# Patient Record
Sex: MALE | Race: WHITE | NOT HISPANIC OR LATINO | Employment: FULL TIME | URBAN - NONMETROPOLITAN AREA
[De-identification: names, ages, dates, MRNs, and addresses within clinical notes are randomized per-mention and may not be internally consistent; named-entity substitution may affect disease eponyms.]

---

## 2018-01-08 ENCOUNTER — OFFICE VISIT CONVERTED (OUTPATIENT)
Dept: FAMILY MEDICINE CLINIC | Age: 48
End: 2018-01-08
Attending: FAMILY MEDICINE

## 2019-02-23 ENCOUNTER — OFFICE VISIT CONVERTED (OUTPATIENT)
Dept: FAMILY MEDICINE CLINIC | Age: 49
End: 2019-02-23
Attending: NURSE PRACTITIONER

## 2019-03-20 ENCOUNTER — HOSPITAL ENCOUNTER (OUTPATIENT)
Dept: OTHER | Facility: HOSPITAL | Age: 49
Discharge: HOME | End: 2019-03-20
Attending: NURSE PRACTITIONER

## 2019-03-20 ENCOUNTER — OFFICE VISIT CONVERTED (OUTPATIENT)
Dept: FAMILY MEDICINE CLINIC | Age: 49
End: 2019-03-20
Attending: NURSE PRACTITIONER

## 2019-03-20 LAB
ALBUMIN SERPL-MCNC: 4.3 G/DL (ref 3.5–5)
ALBUMIN/GLOB SERPL: 1.7 {RATIO} (ref 1.4–2.6)
ALP SERPL-CCNC: 67 U/L (ref 53–128)
ALT SERPL-CCNC: 24 U/L (ref 10–40)
ANION GAP SERPL CALC-SCNC: 16 MMOL/L (ref 8–19)
AST SERPL-CCNC: 20 U/L (ref 15–50)
BILIRUB SERPL-MCNC: 0.32 MG/DL (ref 0.2–1.3)
BUN SERPL-MCNC: 16 MG/DL (ref 5–25)
BUN/CREAT SERPL: 11 {RATIO} (ref 6–20)
CALCIUM SERPL-MCNC: 9.2 MG/DL (ref 8.7–10.4)
CHLORIDE SERPL-SCNC: 102 MMOL/L (ref 99–111)
CHOLEST SERPL-MCNC: 187 MG/DL (ref 107–200)
CHOLEST/HDLC SERPL: 4.3 {RATIO} (ref 3–6)
CONV CO2: 24 MMOL/L (ref 22–32)
CONV TOTAL PROTEIN: 6.9 G/DL (ref 6.3–8.2)
CREAT UR-MCNC: 1.41 MG/DL (ref 0.7–1.2)
ERYTHROCYTE [DISTWIDTH] IN BLOOD BY AUTOMATED COUNT: 12.8 % (ref 11.5–14.5)
GFR SERPLBLD BASED ON 1.73 SQ M-ARVRAT: 58 ML/MIN/{1.73_M2}
GLOBULIN UR ELPH-MCNC: 2.6 G/DL (ref 2–3.5)
GLUCOSE SERPL-MCNC: 83 MG/DL (ref 70–99)
HBA1C MFR BLD: 14.6 G/DL (ref 14–18)
HCT VFR BLD AUTO: 43.6 % (ref 42–52)
HDLC SERPL-MCNC: 44 MG/DL (ref 40–60)
LDLC SERPL CALC-MCNC: 105 MG/DL (ref 70–100)
MCH RBC QN AUTO: 29 PG (ref 27–31)
MCHC RBC AUTO-ENTMCNC: 33.5 G/DL (ref 33–37)
MCV RBC AUTO: 86.5 FL (ref 80–96)
OSMOLALITY SERPL CALC.SUM OF ELEC: 286 MOSM/KG (ref 273–304)
PLATELET # BLD AUTO: 223 10*3/UL (ref 130–400)
PMV BLD AUTO: 10 FL (ref 7.4–10.4)
POTASSIUM SERPL-SCNC: 4.2 MMOL/L (ref 3.5–5.3)
RBC # BLD AUTO: 5.04 10*6/UL (ref 4.7–6.1)
SODIUM SERPL-SCNC: 138 MMOL/L (ref 135–147)
TRIGL SERPL-MCNC: 191 MG/DL (ref 40–150)
VLDLC SERPL-MCNC: 38 MG/DL (ref 5–37)
WBC # BLD AUTO: 6.51 10*3/UL (ref 4.8–10.8)

## 2019-10-30 ENCOUNTER — OFFICE VISIT CONVERTED (OUTPATIENT)
Dept: FAMILY MEDICINE CLINIC | Age: 49
End: 2019-10-30
Attending: NURSE PRACTITIONER

## 2019-11-06 ENCOUNTER — OFFICE VISIT CONVERTED (OUTPATIENT)
Dept: FAMILY MEDICINE CLINIC | Age: 49
End: 2019-11-06
Attending: NURSE PRACTITIONER

## 2019-11-14 ENCOUNTER — OFFICE VISIT CONVERTED (OUTPATIENT)
Dept: FAMILY MEDICINE CLINIC | Age: 49
End: 2019-11-14
Attending: NURSE PRACTITIONER

## 2019-12-02 ENCOUNTER — HOSPITAL ENCOUNTER (OUTPATIENT)
Dept: SURGERY | Facility: CLINIC | Age: 49
Discharge: HOME | End: 2019-12-02
Attending: UROLOGY

## 2019-12-02 ENCOUNTER — OFFICE VISIT CONVERTED (OUTPATIENT)
Dept: UROLOGY | Facility: CLINIC | Age: 49
End: 2019-12-02
Attending: UROLOGY

## 2019-12-16 LAB
COLOR STONE: NORMAL
COMPN STONE: NORMAL
CONV CA OXALATE DIHYDRATE: 25 %
CONV CA OXALATE MONOHYDRATE: 65 %
CONV CALCIUM PHOSPHATE: 10 %
CONV CALCULI COMMENT: NORMAL
CONV CALCULI DISCLAIMER: NORMAL
CONV CALCULI NOTE: NORMAL
NIDUS STONE QL: NORMAL
SIZE STONE: NORMAL MM
SURFACE CRYSTALS: NORMAL
WT STONE: 3 MG

## 2020-01-10 ENCOUNTER — OFFICE VISIT CONVERTED (OUTPATIENT)
Dept: FAMILY MEDICINE CLINIC | Age: 50
End: 2020-01-10
Attending: FAMILY MEDICINE

## 2020-10-07 ENCOUNTER — OFFICE VISIT (OUTPATIENT)
Dept: ORTHOPEDIC SURGERY | Facility: CLINIC | Age: 50
End: 2020-10-07

## 2020-10-07 ENCOUNTER — HOSPITAL ENCOUNTER (OUTPATIENT)
Dept: MRI IMAGING | Facility: HOSPITAL | Age: 50
Discharge: HOME | End: 2020-10-07
Admitting: NURSE PRACTITIONER

## 2020-10-07 VITALS — HEIGHT: 69 IN | TEMPERATURE: 98 F | WEIGHT: 170 LBS | BODY MASS INDEX: 25.18 KG/M2

## 2020-10-07 DIAGNOSIS — M25.511 RIGHT SHOULDER PAIN, UNSPECIFIED CHRONICITY: Primary | ICD-10-CM

## 2020-10-07 DIAGNOSIS — M75.41 IMPINGEMENT SYNDROME OF RIGHT SHOULDER: ICD-10-CM

## 2020-10-07 DIAGNOSIS — M25.511 RIGHT SHOULDER PAIN, UNSPECIFIED CHRONICITY: ICD-10-CM

## 2020-10-07 PROCEDURE — 73030 X-RAY EXAM OF SHOULDER: CPT | Performed by: NURSE PRACTITIONER

## 2020-10-07 PROCEDURE — 99203 OFFICE O/P NEW LOW 30 MIN: CPT | Performed by: NURSE PRACTITIONER

## 2020-10-07 PROCEDURE — 73221 MRI JOINT UPR EXTREM W/O DYE: CPT

## 2020-10-07 NOTE — PROGRESS NOTES
Patient Name: Maldonado Hammer   YOB: 1970  Referring Primary Care Physician: Ely Garduno, APRN  BMI: Body mass index is 25.1 kg/m².    Chief Complaint:    Chief Complaint   Patient presents with   • Right Shoulder - Pain        HPI: New pt that is her for right shoulder pain - pt intial injury was in August pulling wire at work at his job as a  and felt shoulder pop.  Pt did conservative care and then reinjured 3 weeks ago digging at work out of town in [employer] and felt pain in shoulder and has had pain ever since.  Patient has been holding his arm close to his body having pain at night and conservative treatment that has been attempted including rest ice heat immobilization and NSAIDs have failed.  Patient is here today for evaluation and treatment    Maldonado Hammer is a 50 y.o. male who presents today for evaluation of   Chief Complaint   Patient presents with   • Right Shoulder - Pain   .     This problem is new to this examiner.     Subjective   Medications:   Home Medications:  Current Outpatient Medications on File Prior to Visit   Medication Sig   • Ibuprofen (ADVIL PO) Take  by mouth As Needed.     No current facility-administered medications on file prior to visit.      Current Medications:  Scheduled Meds:  Continuous Infusions:No current facility-administered medications for this visit.     PRN Meds:.    I have reviewed the patient's medical history in detail and updated the computerized patient record.  Review and summarization of old records includes:    Past Medical History:   Diagnosis Date   • Depression with anxiety       History reviewed. No pertinent surgical history.     Social History     Occupational History   • Not on file   Tobacco Use   • Smoking status: Current Every Day Smoker     Packs/day: 1.00     Years: 25.00     Pack years: 25.00   Substance and Sexual Activity   • Alcohol use: Yes     Frequency: 2-4 times a month   • Drug use: Defer   • Sexual  "activity: Defer      Social History     Social History Narrative   • Not on file        Family History   Problem Relation Age of Onset   • Cancer Mother         colon   • Hypertension Mother        ROS: 14 point review of systems was performed and all other systems were reviewed and are negative except for documented findings in HPI and today's encounter.     Allergies: No Known Allergies  Constitutional:  Denies fever, shaking or chills   Eyes:  Denies change in visual acuity   HENT:  Denies nasal congestion or sore throat   Respiratory:  Denies cough or shortness of breath   Cardiovascular:  Denies chest pain or severe LE edema   GI:  Denies abdominal pain, nausea, vomiting, bloody stools or diarrhea   Musculoskeletal:  Numbness, tingling, pain, or loss of motor function only as noted above in history of present illness.  : Denies painful urination or hematuria  Integument:  Denies rash, lesion or ulceration   Neurologic:  Denies headache or focal weakness  Endocrine:  Denies lymphadenopathy  Psych:  Denies confusion or change in mental status   Hem:  Denies active bleeding    OBJECTIVE:  Physical Exam: 50 y.o. male  Wt Readings from Last 3 Encounters:   10/07/20 77.1 kg (170 lb)   10/07/20 77.1 kg (170 lb)     Ht Readings from Last 1 Encounters:   10/07/20 175.3 cm (69\")     Body mass index is 25.1 kg/m².  Vitals:    10/07/20 1027   Temp: 98 °F (36.7 °C)     Vital signs reviewed.     General Appearance:    Alert, cooperative, in no acute distress                  Eyes: conjunctiva clear  ENT: external ears and nose atraumatic  CV: no peripheral edema  Resp: normal respiratory effort  Skin: no rashes or wounds; normal turgor  Psych: mood and affect appropriate  Lymph: no nodes appreciated  Neuro: gross sensation intact  Vascular:  Palpable peripheral pulse in noted extremity  Musculoskeletal Extremities: Skin is warm dry and intact with good pulses mood sensation right shoulder is held close to the body patient " has very limited range of motion he cannot perform full range of motion even with assistance he has reduced mobility pain and weakness to abduction and abduction elbows nontender wrist is nontender empty can is positive    Radiology:   Right shoulder x-rays done for pain no acute fracture no comparison high riding humeral head arthritic changes  Assessment:     ICD-10-CM ICD-9-CM   1. Right shoulder pain, unspecified chronicity  M25.511 719.41   2. Impingement syndrome of right shoulder  M75.41 726.2        Procedures  Placed in a sling will get an MRI as soon as possible to assess for rotator cuff tear high suspicion for pathology`    Plan: Biomechanics of pertinent body area discussed.  Risks, benefits, alternatives, comparisons, and complications of accepted medicines, injections, recommendations, surgical procedures, and therapies explained and education provided in laymen's terms. Natural history and expected course of this patient's diagnosis discussed along with evaluation of therapies. Questions answered. When appropriate I also discussed proper use of cane, walker, trekking poles.   EXERCISES:  Advice on benefits of, and types of regular/moderate exercise including biomechanical forces involved as it pertains to this complaint.  MEDICATIONS:  Prescription, OTC and Monitoring of Medications per orders to address ortho complaints; Evaluation and discussion of safety, precautions, side effects, and warnings given especially of long term NSAID or steroid therapy.    RICE: Rest, ice, compression, and elevation therapy, Cryotherapy/brachy therapy, and or OTC linaments as indicated with instructions.   MRI.      10/7/2020    Much of this encounter note is an electronic transcription/translation of spoken language to printed text. The electronic translation of spoken language may permit erroneous, or at times, nonsensical words or phrases to be inadvertently transcribed; Although I have reviewed the note for such  errors, some may still exist

## 2020-10-08 ENCOUNTER — TELEPHONE (OUTPATIENT)
Dept: ORTHOPEDIC SURGERY | Facility: CLINIC | Age: 50
End: 2020-10-08

## 2020-10-08 DIAGNOSIS — S43.101A SEPARATION OF RIGHT ACROMIOCLAVICULAR JOINT, INITIAL ENCOUNTER: Primary | ICD-10-CM

## 2020-10-08 NOTE — TELEPHONE ENCOUNTER
----- Message from JIHAN Jin sent at 10/8/2020 11:58 AM EDT -----  No rotator cuff tear  AC joint sprain /separation that- needs a cortisone injection and physical therapy. See if can get in with montse willson/terence for an injection in [place] and I can set up Physical therapy in [place]. Can use sling for comfort. cim

## 2020-10-08 NOTE — TELEPHONE ENCOUNTER
Spoke with patient again regarding his appt.  He elected to moved it up and see Dr Zaragoza on 10/12/2020, appt with Jerrod was cancelled.

## 2020-10-12 ENCOUNTER — OFFICE VISIT (OUTPATIENT)
Dept: ORTHOPEDIC SURGERY | Facility: CLINIC | Age: 50
End: 2020-10-12

## 2020-10-12 VITALS — TEMPERATURE: 98.4 F | WEIGHT: 173 LBS | BODY MASS INDEX: 25.62 KG/M2 | HEIGHT: 69 IN

## 2020-10-12 DIAGNOSIS — M19.011 ARTHRITIS OF RIGHT ACROMIOCLAVICULAR JOINT: Primary | ICD-10-CM

## 2020-10-12 PROCEDURE — 99203 OFFICE O/P NEW LOW 30 MIN: CPT | Performed by: ORTHOPAEDIC SURGERY

## 2020-10-12 NOTE — PROGRESS NOTES
General Exam    Patient: Maldonado Hammer    YOB: 1970    Medical Record Number: [id number]    Chief Complaints: Right shoulder pain    History of Present Illness:     50 y.o. male patient who presents for evaluation treatment of right shoulder pain.  Patient states he has had right shoulder pain for the past 4 weeks.  He is right-hand dominant.  States 4 weeks ago he works as an  and he was pulling some cable and heard a pop and then had pain.  He gave a rest for a couple weeks does not seem to improve so he [facility].  He describes the pain is dull and achy but sharp with certain movements.  Overall he has not lost any function but certain extreme motions cause him discomfort.  X-rays and MRI were done since his recent visit.  He does not note any new changes.  Symptoms not radiate down his arm.  Rest and avoiding certain activities make it better certain movements seem to make it worse.  He has taken some Aleve which is given him some relief as well.    Denies any numbness or tingling.  Denies any fevers, cough or shortness of breath.    Allergies: No Known Allergies    Home Medications:      Current Outpatient Medications:   •  Ibuprofen (ADVIL PO), Take  by mouth As Needed., Disp: , Rfl:     Past Medical History:   Diagnosis Date   • Depression with anxiety        History reviewed. No pertinent surgical history.    Social History     Occupational History   • Not on file   Tobacco Use   • Smoking status: Current Every Day Smoker     Packs/day: 1.00     Years: 25.00     Pack years: 25.00   Substance and Sexual Activity   • Alcohol use: Yes     Frequency: 2-4 times a month   • Drug use: Defer   • Sexual activity: Defer      Social History     Social History Narrative   • Not on file       Family History   Problem Relation Age of Onset   • Cancer Mother         colon   • Hypertension Mother        Review of Systems:      Constitutional: Denies fever, shaking or chills  "  Eyes: Denies change in visual acuity   HEENT: Denies nasal congestion or sore throat   Respiratory: Denies cough or shortness of breath   Cardiovascular: Denies chest pain or edema  Endocrine: Denies tremors, palpitations, intolerance of heat or cold, polyuria, polydipsia.  GI: Denies abdominal pain, nausea, vomiting, bloody stools or diarrhea  : Denies frequency, urgency, incontinence, retention, or nocturia.  Musculoskeletal: Denies numbness, tingling or loss of motor function except as above  Integument: Denies rash, lesion or ulceration   Neurologic: Denies headache or focal weakness, deficits  Heme: Denies spontaneous or excessive bleeding, epistaxis, hematuria, melena, fatigue, enlarged or tender lymph nodes.      All other pertinent positives and negatives as noted above in HPI.    Physical Exam: 50 y.o. male    Vitals:    10/12/20 1537   Temp: 98.4 °F (36.9 °C)   TempSrc: Temporal   Weight: 78.5 kg (173 lb)   Height: 175.3 cm (69\")       General:  Patient is awake and alert.  Appears in no acute distress or discomfort.    Psych:  Affect and demeanor are appropriate.    Eyes:  Conjunctiva and sclera appear grossly normal.  Eyes track well and EOM seem to be intact.    Ears:  No gross abnormalities.  Hearing adequate for the exam.    Cardiovascular:  Regular rate and rhythm.    Lungs:  Good chest expansion.  Breathing unlabored.    Lymph:  No palpable masses or adenopathy in the affected extremity    Musculoskeletal/Extremities:      Right upper extremity:    Observation shoulder symmetry is equal bilaterally.  He has some tenderness to deep palpation along the AC joint.  Shoulder range of motion is full however he does have some pain with reaching over to the other shoulder and with  Adduction and internal rotation of the arm.  5 out of 5 to all muscle groups tested.  Sensation intact distally light touch.  Skin is intact.  2+ radial pulses.    Positive crossover test    Positive Neer, negative Weber, " Monica, belly press, external rotation lag.         Radiology:       3 views of the right shoulder were evaluated in the office today.  Taken to evaluate the patient's complaint/s.  Glenohumeral joint space maintained no acute fractures lesions appreciated.  There is some decreased joint space with minimal osteophyte formation in the AC joint suggestive of AC joint arthritis.    MRI of the right shoulder was reviewed rotator cuff is intact there is evidence of some AC joint inflammation with bone edema distal to the clavicle.  No acute fractures or lesions appreciated.     No imaging for comparison.    Assessment/Plan:      Right AC joint arthritis    Conservative measures consist of anti-inflammatories, activity modification, formal physical therapy, and ultrasound-guided steroid injection of the AC joint.  Patient would like to proceed with conservative management he will continue take his anti-inflammatory we ordered him formal physical therapy he work on activity modification at his job for the time being and an order was put in for ultrasound-guided steroid injection.  I discussed that if these conservative measures fail we may need to do have him evaluated potential surgical intervention if so I would like him to be referred to Dr. Frye for this.   He does not improve over the next 8 weeks he may return for further evaluation and treatment.        We will plan for follow up as needed.    All questions were answered.  Patient understands and agrees with the plan.    Evans Zaragoza MD    10/12/2020    CC to Ely Garduno APRN    Procedures

## 2020-10-13 ENCOUNTER — TELEPHONE (OUTPATIENT)
Dept: ORTHOPEDICS | Facility: OTHER | Age: 50
End: 2020-10-13

## 2020-10-13 NOTE — TELEPHONE ENCOUNTER
PATIENT: CHRISTI SINGH  CONTACT: [phone]  PROVIDER: LORI LAFLEUR    PATIENT CONTACTED TO SPEAK TO DR. LAFLEUR AND WANTED TO SPEAK ABOUT  HIS CARLEE. INJECTION. PATIENT HAD AN APPT. WITH  AND DIRECTED PATIENT THAT HE COULD NOT GIVE HIM THE INJECTION WITHOUT HAVING AN ULTRASOUND.     HE WOULD LIKE DR. LORI LAFLEUR TO CONTACT HIM BACK ABOUT THIS.    THANK YOU

## 2020-10-14 ENCOUNTER — TELEPHONE (OUTPATIENT)
Dept: ORTHOPEDIC SURGERY | Facility: CLINIC | Age: 50
End: 2020-10-14

## 2020-10-14 ENCOUNTER — CLINICAL SUPPORT (OUTPATIENT)
Dept: ORTHOPEDIC SURGERY | Facility: CLINIC | Age: 50
End: 2020-10-14

## 2020-10-14 VITALS — WEIGHT: 170 LBS | TEMPERATURE: 97.5 F | BODY MASS INDEX: 25.18 KG/M2 | HEIGHT: 69 IN

## 2020-10-14 DIAGNOSIS — M19.011 ARTHRITIS OF RIGHT ACROMIOCLAVICULAR JOINT: Primary | ICD-10-CM

## 2020-10-14 PROCEDURE — 20610 DRAIN/INJ JOINT/BURSA W/O US: CPT | Performed by: PHYSICIAN ASSISTANT

## 2020-10-14 RX ADMIN — METHYLPREDNISOLONE ACETATE 160 MG: 80 INJECTION, SUSPENSION INTRA-ARTICULAR; INTRALESIONAL; INTRAMUSCULAR; SOFT TISSUE at 14:52

## 2020-10-14 NOTE — ADDENDUM NOTE
Addended by: JONH LEONARDO on: 10/14/2020 11:35 AM     Modules accepted: Level of Service, SmartSet

## 2020-10-14 NOTE — TELEPHONE ENCOUNTER
Called the patient as he is seeking treatment now with Jerrod Chi after seeing Dr Zaragoza.   The patient states he wants to follow up in [place] since he lives there.     We also discussed his work comp claim.  He states he did feel a pop while working.   I explained that has indicated numerous times, on his paperwork and during his examination with the providers.   I indicated that we can't knowingly bill his private insurance.   We will attempt to get his work comp approved and work to get his MRI, previously performed, covered as well.     Patient understands that once he initiates his care in [place], he will follow with them.  He also understands that he may receive a denial from [facility] for his MRI and that we make no guarantee to be able to get this approved after the fact.     richard

## 2020-10-14 NOTE — TELEPHONE ENCOUNTER
Spoke with patient and informed him that Jerrod can work him in this afternoon at 2:30.  Patient voiced understanding and was agreeable with the appt.  I informed him that he should just follow up with her or [facility] for further shoulder problems.

## 2020-10-16 ENCOUNTER — TELEPHONE (OUTPATIENT)
Dept: ORTHOPEDIC SURGERY | Facility: CLINIC | Age: 50
End: 2020-10-16

## 2020-10-18 PROBLEM — M19.011 ARTHRITIS OF RIGHT ACROMIOCLAVICULAR JOINT: Status: ACTIVE | Noted: 2020-10-18

## 2020-10-18 RX ORDER — METHYLPREDNISOLONE ACETATE 80 MG/ML
160 INJECTION, SUSPENSION INTRA-ARTICULAR; INTRALESIONAL; INTRAMUSCULAR; SOFT TISSUE
Status: COMPLETED | OUTPATIENT
Start: 2020-10-14 | End: 2020-10-14

## 2020-10-19 NOTE — PROGRESS NOTES
INJECTION      Patient: Maldonado Hammer    MRN: [id number]    YOB: 1970    Chief Complaint   Patient presents with   • Right Shoulder - Follow-up, Pain   • Injections         History of Present Illness:  Maldonado Hammer is here today for injection therapy. He is receiving first time  RIGHT shoulder injections of steroid. He has been seen in our [place] office by JIHAN Jin and Dr. Evans Zaragoza. He was scheduled with me for an appointment because it is closer to his residence. MRI of the shoulder reveals AC joint arthritis. He reports pain over the AC joint with reaching across his body and with reaching behind his body. Treatment options have been discussed and he understands and consents.       Physical Exam:   50 y.o. male awake, alert, oriented, in no acute distress and well developed, well nourished.  RIGHT shoulder  · Positive for pain and tenderness with palpation over the subcromial bursa.   · Forward flexion is 0- 130 degrees, abduction is 0-130 degrees.  · Neer sign is positive.   · Sulcus sign is negative. Drop arm sign is negative.   · Apprehension sign is negative.   · Axillary nerve function is well preserved. Radial artery pulses are palpable.   · There is no evidence of multidirectional instability.   · The pain level is 6.      Procedures  See separate procedure note  Injection site was identified by physical examination and cleaned with Betadine and alcohol swabs. Prior to needle insertion, ethyl chloride spray was used for surface anesthesia. Sterile technique was used.       ASSESSMENT:  Diagnoses and all orders for this visit:    1. Arthritis of right acromioclavicular joint (Primary)  -     Large Joint Arthrocentesis: R subacromial bursa          PLAN:   • Right shoulder steroid injection was discussed with the patient. Discussed indication, risks, benefits, and alternatives. Verbal consent was given to proceed with the procedure.   • Injection was performed from  anteromedial approach.  Patient tolerated the procedure well and no complications were encountered.  • Discussion of orthopedic goals and activities and patient/guardian expressed understanding.  • Ice, heat, rest, compression and elevation of extremity as beneficial  • nsaids and/or tylenol as beneficial  • Instructed to refrain from heavy activity/rest the extremity for the next 24-48 hours  • Discussion regarding possibility of cortisol flare and what to expect if this occurs  • Watch for signs and symptoms of infection  • Call if adverse effect from injection therapy  • Follow up in 3 months      Jerrod Chi PA-C  Encounter Date: 10/14/2020    Electronically signed by Jerrod Chi PA-C, 10/18/20, 10:51 PM EDT.      EMR Dragon/Transcription disclaimer:  Much of this encounter note is an electronic transcription/translation of spoken language to printed text. The electronic translation of spoken language may permit erroneous, or at times, nonsensical words or phrases to be inadvertently transcribed; Although I have reviewed the note for such errors, some may still exist.

## 2021-01-13 ENCOUNTER — CLINICAL SUPPORT (OUTPATIENT)
Dept: ORTHOPEDIC SURGERY | Facility: CLINIC | Age: 51
End: 2021-01-13

## 2021-01-13 VITALS — BODY MASS INDEX: 25.18 KG/M2 | HEIGHT: 69 IN | TEMPERATURE: 96.9 F | WEIGHT: 170 LBS

## 2021-01-13 DIAGNOSIS — M75.41 IMPINGEMENT SYNDROME OF RIGHT SHOULDER: ICD-10-CM

## 2021-01-13 DIAGNOSIS — M25.511 RIGHT SHOULDER PAIN, UNSPECIFIED CHRONICITY: Primary | ICD-10-CM

## 2021-01-13 PROCEDURE — 20610 DRAIN/INJ JOINT/BURSA W/O US: CPT | Performed by: PHYSICIAN ASSISTANT

## 2021-01-13 PROCEDURE — 99213 OFFICE O/P EST LOW 20 MIN: CPT | Performed by: PHYSICIAN ASSISTANT

## 2021-01-13 RX ORDER — LIDOCAINE HYDROCHLORIDE 10 MG/ML
2 INJECTION, SOLUTION INFILTRATION; PERINEURAL
Status: COMPLETED | OUTPATIENT
Start: 2021-01-13 | End: 2021-01-13

## 2021-01-13 RX ORDER — METHYLPREDNISOLONE ACETATE 80 MG/ML
160 INJECTION, SUSPENSION INTRA-ARTICULAR; INTRALESIONAL; INTRAMUSCULAR; SOFT TISSUE
Status: COMPLETED | OUTPATIENT
Start: 2021-01-13 | End: 2021-01-13

## 2021-01-13 RX ADMIN — METHYLPREDNISOLONE ACETATE 160 MG: 80 INJECTION, SUSPENSION INTRA-ARTICULAR; INTRALESIONAL; INTRAMUSCULAR; SOFT TISSUE at 15:17

## 2021-01-13 RX ADMIN — LIDOCAINE HYDROCHLORIDE 2 ML: 10 INJECTION, SOLUTION INFILTRATION; PERINEURAL at 15:17

## 2021-01-13 NOTE — PROGRESS NOTES
"Chief Complaint  Follow-up and Pain of the Right Shoulder    Subjective    History of Present Illness      Maldonado Hammer is a 50 y.o. male who presents to [facility] for is here today for injection therapy. He receives RIGHT shoulder injections of steroid.  Received one prior steroid injection to the shoulder and states he did experience a cortisol flare after but felt great within a few days.  He states it did not give him complete relief of pain and it did not last a full 3 months.  He reports significant tenderness with light palpation over the right AC joint. He is doing repetitive motion in his job and he has been treated under workman's comp since initial visit.  Treatment options have been discussed and he understands and consents.       Objective   Vital Signs:   Temp 96.9 °F (36.1 °C)   Ht 175.3 cm (69\")   Wt 77.1 kg (170 lb)   BMI 25.10 kg/m²     Physical Exam  50 y.o. male is awake, alert, oriented, in no acute distress and well developed, well nourished.  Ortho Exam   RIGHT shoulder  Positive for pain and tenderness with palpation over the subcromial bursa.   Positive for signs of impingement with internal and external rotation.   Forward flexion is 0- 130 degrees, abduction is 0-130 degrees, external rotation is 0-50 degrees.   Rotator cuff function is fairly well preserved except impingement at 90 degrees.   Weber's sign is positive. Neer sign is positive.   Sulcus sign is negative. Drop arm sign is negative.   Apprehension sign is negative.   Axillary nerve function is well preserved. Radial artery pulses are palpable.   There is no evidence of multidirectional instability.   The pain level is 6.      Result Review :   The following data was reviewed by: Jerrod Chi PA-C on 01/13/2021:  Data reviewed: Radiologic studies from initial visit with Suzan Molina on 10/7/20  I, myself, independently interpreted right shoulder xrays 3 views: " moderate AC joint arthritis    Review of MRI report of the right shoulder, summary of radiologist's impression as follows: mild ac joint arthritis with bone marrow and capsular edema around AC joint which could be related to AC joint sprain or inflammation associated with microtrauma.           Procedures   See separate procedure note  Injection site was identified by physical examination and cleaned with Betadine and alcohol swabs. Prior to needle insertion, ethyl chloride spray was used for surface anesthesia. Sterile technique was used.       Assessment   Assessment and Plan    Problem List Items Addressed This Visit     None      Visit Diagnoses     Right shoulder pain, unspecified chronicity    -  Primary    Relevant Orders    Large Joint Arthrocentesis: R glenohumeral        I spent 20 minutes caring for Maldonado on this date of service. This time includes time spent by me in the following activities:reviewing tests, performing a medically appropriate examination and/or evaluation , referring and communicating with other health care professionals , documenting information in the medical record and independently interpreting results and communicating that information with the patient/family/caregiver.  Follow Up   • Right shoulder steroid injection was discussed with the patient. Discussed indication, risks, benefits, and alternatives. Verbal consent was given to proceed with the procedure.   • Injection was performed from anteromedial approach.  Patient tolerated the procedure well and no complications were encountered.  • Discussion of orthopedic goals and activities and patient/guardian expressed understanding.  • Ice, heat, rest, compression and elevation of extremity as beneficial  • nsaids and/or tylenol as beneficial  • Instructed to refrain from heavy activity/rest the extremity for the next 24-48 hours  • Discussion regarding possibility of cortisol flare and what to expect if this occurs  • Watch for  signs and symptoms of infection  • Call if adverse effect from injection therapy  • Follow up in 3 months  • Patient was given instructions and counseling regarding his condition or for health maintenance advice. Please see specific information pulled into the AVS if appropriate.     Jerrod Chi PA-C   Date of Encounter: 1/13/2021   Electronically signed by Jerrod Chi PA-C, 01/13/21, 3:58 PM EST.     EMR Dragon/Transcription disclaimer:  Much of this encounter note is an electronic transcription/translation of spoken language to printed text. The electronic translation of spoken language may permit erroneous, or at times, nonsensical words or phrases to be inadvertently transcribed; Although I have reviewed the note for such errors, some may still exist.

## 2021-01-13 NOTE — PROGRESS NOTES
Procedure   Large Joint Arthrocentesis: R glenohumeral  Date/Time: 1/13/2021 3:17 PM  Consent given by: patient  Site marked: site marked  Timeout: Immediately prior to procedure a time out was called to verify the correct patient, procedure, equipment, support staff and site/side marked as required   Supporting Documentation  Indications: pain   Procedure Details  Location: shoulder - R glenohumeral  Preparation: Patient was prepped and draped in the usual sterile fashion  Needle size: 25 G  Approach: anteromedial  Medications administered: 2 mL lidocaine 1 %; 160 mg methylPREDNISolone acetate 80 MG/ML  Patient tolerance: patient tolerated the procedure well with no immediate complications      Electronically signed by Jerrod Chi PA-C, 01/13/21, 3:56 PM EST.

## 2021-01-26 ENCOUNTER — TELEPHONE (OUTPATIENT)
Dept: ORTHOPEDIC SURGERY | Facility: CLINIC | Age: 51
End: 2021-01-26

## 2021-01-26 NOTE — TELEPHONE ENCOUNTER
Advised I have talked with [facility] about possible shoulder scope and renea resection if he was interested. He states the last shot I administered lasted a couple of weeks. He reports he has some good days and some bad days. He asks if I can send him a paper with the procedure that would be done in surgery so he can call his insurance company and see what his OOP cost would be.   Is This A New Presentation, Or A Follow-Up?: Skin Lesion What Type Of Note Output Would You Prefer (Optional)?: Standard Output How Severe Is Your Skin Lesion?: moderate Has Your Skin Lesion Been Treated?: not been treated Additional History: Patient states he cut himself there a couple months ago. He believes the lesion might just be a scar but wanted to get it evaluate to be sure,

## 2021-05-15 VITALS — HEIGHT: 69 IN | WEIGHT: 175 LBS | RESPIRATION RATE: 14 BRPM | BODY MASS INDEX: 25.92 KG/M2

## 2021-05-18 NOTE — PROGRESS NOTES
Maldonado Hammer 1970     Office/Outpatient Visit    Visit Date: Wed, Oct 30, 2019 04:22 pm    Provider: Ely Garduno N.P. (Assistant: Renita Johnson MA)    Location: [facility]        Electronically signed by Ely Garduno N.P. on  10/30/2019 05:07:49 PM                             SUBJECTIVE:        CC:     Rudolph is a 49 year old White male.  presents today due to complaints of urinary frequency and dysuria X 2 days         HPI:         Concerning urinary frequency, this was first noted 2 days ago.  Associated symptoms include abdominal pain ( mild LLQ abdominal pain ), urinary frequency and dysuria.  He denies associated back pain, chills, fever,  hematuria or urgency.  Medical history is pertinent for renal stones.  Treatments tried thus far include None.  Denies penile discharge.     ROS:     CONSTITUTIONAL:  Negative for chills and fever.      CARDIOVASCULAR:  Negative for chest pain and palpitations.      RESPIRATORY:  Negative for dyspnea and frequent wheezing.      GASTROINTESTINAL:  Positive for abdominal pain ( LLQ ).   Negative for constipation, diarrhea, nausea or vomiting.      GENITOURINARY:  Positive for dysuria and polyuria.   Negative for hematuria.          [facility]/:     Last Reviewed on 10/30/2019 04:47 PM by Ely Garduno    Past Medical History:             PAST MEDICAL HISTORY         Renal Stones         PREVENTIVE HEALTH MAINTENANCE             COLORECTAL CANCER SCREENING: Up to date (colonoscopy q10y; sigmoidoscopy q5y; Cologuard q3y) was last done while in his 20s; colonoscopy with the following abnormalities noted-- Polyp(s)         Surgical History:         Positive for    Vasectomy: 1997; ;         Family History:     Father: Cause of death was CHF     Mother: Cause of death was colon CA         Social History:     Occupation: ;     Marital Status:      Children: 3 children         Tobacco/Alcohol/Supplements:     Last Reviewed on 10/30/2019 04:25  PM by Renita Johnson    Tobacco: Current Smoker: He currently smokes every day, 1 pack per day.          Alcohol: Frequency:    RARE;     Caffeine:  He admits to consuming caffeine via coffee ( 6 servings per day ) and soda ( 2 servings per day ).          Substance Abuse History:     Last Reviewed on 1/08/2018 02:44 PM by Kaiden Hendricks        Mental Health History:     Last Reviewed on 1/08/2018 02:44 PM by Kaiden Hendricks        Communicable Diseases (eg STDs):     Last Reviewed on 1/08/2018 02:44 PM by Kaiden Hendricks            Current Problems:     Last Reviewed on 1/08/2018 02:44 PM by Kaiden Hendricks    Urinary frequency     History of colonic polyps     Cigarette smoking         Immunizations:     Tetanus immune globulin (TIG), human, IM 1/1/2014     Fluzone Quadrivalent (3+ years) 2/23/2019     Fluzone Quadrivalent (3+ years) 10/30/2019         Allergies:     Last Reviewed on 10/30/2019 04:25 PM by Renita Johnson    Penicillins:        Current Medications:     Last Reviewed on 10/30/2019 04:25 PM by Renita Johnson    None        OBJECTIVE:        Vitals:         Current: 10/30/2019 4:27:13 PM    Ht:  5 ft, 8.5 in;  Wt: 175 lbs;  BMI: 26.2    T: 98.3 F (oral);  BP: 124/76 mm Hg (right arm, sitting);  P: 87 bpm (right arm (BP Cuff), sitting);  sCr: 1.41 mg/dL;  GFR: 60.17        Exams:     PHYSICAL EXAM:     GENERAL: well developed, well nourished;  no apparent distress;     RESPIRATORY: normal respiratory rate and pattern with no distress; normal breath sounds with no rales, rhonchi, wheezes or rubs;     CARDIOVASCULAR: normal rate; rhythm is regular;     GASTROINTESTINAL: nontender; normal bowel sounds; no organomegaly;     MUSCULOSKELETAL: normal gait;     NEUROLOGIC: mental status: alert and oriented x 3; GROSSLY INTACT     PSYCHIATRIC: appropriate affect and demeanor;         Lab/Test Results:             Glucose, Urine:  Neg (10/30/2019),     Bilirubin, urine:  Negative  (10/30/2019),     Ketones, Urine Strip:  Negative (10/30/2019),     Specific Gravity, urine:  1.020 (10/30/2019),     Blood in Urine:  moderate (10/30/2019),     pH, urine:  6.5 (10/30/2019),     Protein Urine QL:  negative (10/30/2019),     Urobilinogen, urine:  0.2 E.U./dL (10/30/2019),     Nitrite, Urine:  Negative (10/30/2019),     Leukoctyes, urine:  Negative (10/30/2019),     Appearance:  Clear (10/30/2019),     collection source:  Clean-catch (10/30/2019),     Color:  Yellow (10/30/2019),     Performed by::  AS (10/30/2019),             Procedures:     Vaccination against other viral diseases, Influenza     1. Influenza, seasonal PF (children 3 years to adult): 0.5 ml unit dose given IM in the left upper arm; administered by AS;  lot number [id number]; expires 06/30/2020 Regarding contraindications to an Influenza vaccine: Denies moderate/severe illness with/without fever; serious reaction to eggs, egg proteins, gentamicin, gelatin, arginine, neomycin or polymixin; serious reaction after recieving previous influenza vaccines; and history of Guillain-Rantoul Syndrome.              ASSESSMENT           578.0   K92.0  Hematemesis              DDx:     V04.81   Z23  Vaccination against other viral diseases, Influenza              DDx:         ORDERS:         Meds Prescribed:       Flomax (Tamsulosin HCl) 0.4mg Capsules Take 1 capsule(s) by mouth daily  #30 (Thirty) capsule(s) Refills: 0         Lab Orders:       [id number]  Urinalysis, automated, without microscopy  (In-House)           Procedures Ordered:       80517  Immunization administration; one vaccine  (In-House)           Other Orders:       94650  Influenza virus vaccine, quadrivalent, split virus, preservative free 3 years of age & older  (In-House)                   PLAN:          Hematemesis With history of renal stone and blood in urine, discussed possibility of another stone. No signs of infection on urine testing. Advised increased water intake. Decrease  caffeine intake. Patient declines imaging such as CT today. Will follow up if symptoms worsen. Will set reminder to repeat UA in 6 weeks to ensure no blood in urine.     MIPS Vaccines Flu and Pneumonia updated in Shot record     FOLLOW-UP: for Annual Checkup:Patient will call to schedule follow-up appointment           Prescriptions:       Flomax (Tamsulosin HCl) 0.4mg Capsules Take 1 capsule(s) by mouth daily  #30 (Thirty) capsule(s) Refills: 0           Orders:       98643  Urinalysis, automated, without microscopy  (In-House)            Vaccination against other viral diseases, Influenza           Orders:       56132  Immunization administration; one vaccine  (In-House)         32201  Influenza virus vaccine, quadrivalent, split virus, preservative free 3 years of age & older  (In-House)               CHARGE CAPTURE           **Please note: ICD descriptions below are intended for billing purposes only and may not represent clinical diagnoses**        Primary Diagnosis:         578.0 Hematemesis            K92.0    Hematemesis              Orders:          46020   Office/outpatient visit; established patient, level 3  (In-House)             09631   Urinalysis, automated, without microscopy  (In-House)           V04.81 Vaccination against other viral diseases, Influenza            Z23    Encounter for immunization              Orders:          99453   Immunization administration; one vaccine  (In-House)             02069   Influenza virus vaccine, quadrivalent, split virus, preservative free 3 years of age & older  (In-House)               ADDENDUMS:      ____________________________________    Addendum: 11/06/2019 01:33 PM - Ely Garduno        Change diagnosis to hematuria.

## 2021-05-18 NOTE — PROGRESS NOTES
Maldonado Hammer  1970     Office/Outpatient Visit    Visit Date: Fri, Howard 10, 2020 10:53 am    Provider: Abelardo Edward MD (Assistant: Nora Michael MA)    Location: [facility]        Electronically signed by Abelardo Edward MD on  01/10/2020 07:01:17 PM                             Subjective:        CC: Rudolph is a 49 year old White male.  He presents with cough, congestion, body aches.          HPI:       BP is 138/90 and HR is 70. Patient states he has body aches, sore throat, sinus pressure with drainage and productive cough with green sputum that started about 3 days ago. He has taken dayquil,a histamine and Bromfed DM at night. No fever no headache just sinus pressure. His wife had similar Sx last week. Yesterday was really bad with watery eyes and sinus pressure. Today very achy. No fever.    ROS:     CONSTITUTIONAL:  Positive for fatigue and fever.      EYES:  Negative for blurred vision.      E/N/T:  Positive for sinus pressure and sore throat.   Negative for diminished hearing or nasal congestion.      CARDIOVASCULAR:  Negative for chest pain and palpitations.      RESPIRATORY:  Positive for recent cough ( with scant amounts of purulent sputum ).   Negative for dyspnea.      GASTROINTESTINAL:  Negative for abdominal pain, constipation, diarrhea, nausea and vomiting.      GENITOURINARY:  Negative for dysuria.      MUSCULOSKELETAL:  Positive for myalgias.   Negative for arthralgias.      INTEGUMENTARY:  Negative for atypical mole(s) and rash.      NEUROLOGICAL:  Negative for paresthesias and weakness.      PSYCHIATRIC:  Negative for anxiety, depression and sleep disturbance.          Past Medical History / Family History / Social History:         Last Reviewed on 1/10/2020 11:33 AM by Abelardo Edward    Past Medical History:             PAST MEDICAL HISTORY         Renal Stones         PREVENTIVE HEALTH MAINTENANCE             COLORECTAL CANCER SCREENING: Up to date (colonoscopy  q10y; sigmoidoscopy q5y; Cologuard q3y) was last done while in his 20s; colonoscopy with the following abnormalities noted-- Polyp(s)         Surgical History:         Positive for    Vasectomy: 1997; ;         Family History:     Father: Cause of death was CHF     Mother: Cause of death was colon CA         Social History:     Occupation: ;     Marital Status:      Children: 3 children         Tobacco/Alcohol/Supplements:     Last Reviewed on 1/10/2020 11:33 AM by Abelardo Edward    Tobacco: Current Smoker: He currently smokes every day, 1 pack per day.          Alcohol: Frequency:    RARE;     Caffeine:  He admits to consuming caffeine via coffee ( 6 servings per day ) and soda ( 2 servings per day ).          Substance Abuse History:     Last Reviewed on 1/10/2020 11:33 AM by Abelardo Edward        Mental Health History:     Last Reviewed on 1/10/2020 11:33 AM by Abelardo Edward        Communicable Diseases (eg STDs):     Last Reviewed on 1/10/2020 11:33 AM by Abelardo Edward        Current Problems:     Last Reviewed on 1/10/2020 11:33 AM by Abelardo Edward    Nicotine dependence, unspecified, uncomplicated    Cigarette smoking    Personal history of colonic polyps    History of colonic polyps    Urinary calculus, unspecified    Ureteral stone    Acute bronchitis    Cough        Immunizations:     Tetanus immune globulin (TIG), human, IM 1/1/2014    Fluzone Quadrivalent (3+ years) 2/23/2019    Fluzone Quadrivalent (3+ years) 10/30/2019        Allergies:     Last Reviewed on 1/10/2020 11:33 AM by Abelardo Edward    Penicillins:          Current Medications:     Last Reviewed on 1/10/2020 11:33 AM by Abelardo Edward    Flomax 0.4mg Capsules [Take 1 capsule(s) by mouth daily]    Bromfed DM 2-30-10 mg/5 mL oral Syrup [take 10 milliliters by oral route every 4 hours as needed for cough]        Objective:        Vitals:         Current: 1/10/2020 10:58:03 AM    Ht:  5 ft, 8.5 in;  Wt: 174.8  lbs;  BMI: 26.2T: 98.5 F (oral);  BP: 138/90 mm Hg (left arm, sitting);  P: 70 bpm (left arm (BP Cuff), sitting);  sCr: 1.41 mg/dL;  GFR: 60.14        Exams:     PHYSICAL EXAM:     GENERAL: Vitals recorded well developed, well nourished;  well groomed;  no apparent distress, appears minimally ill, tired-appearing;     EYES: extraocular movements intact; conjunctiva and cornea are normal; PERRLA;     E/N/T: NOSE: nasal mucosa is boggy;  OROPHARYNX: posterior pharynx shows erythema;     NECK: range of motion is normal; thyroid exam reveals not enlarged;     RESPIRATORY: normal respiratory rate and pattern with no distress; normal breath sounds with no rales, rhonchi, wheezes or rubs;     CARDIOVASCULAR: normal rate; rhythm is regular;  no systolic murmur; no edema;     GASTROINTESTINAL: nontender; normal bowel sounds;     LYMPHATIC: no enlargement of cervical or facial nodes;     MUSCULOSKELETAL: normal gait; normal overall tone     NEUROLOGIC: mental status: alert and oriented x 3; GROSSLY INTACT     PSYCHIATRIC:  appropriate affect and demeanor; normal speech pattern; grossly normal memory;         Lab/Test Results:         Influenza A and B: Negative (01/10/2020),     Performed by:: tls (01/10/2020),             Assessment:         R05   Cough           ORDERS:         Lab Orders:       [id number]  Infectious agent antigen detection by immunoassay; Influenza  (In-House)            11562  Infectious agent antigen detection by immunoassay; Influenza  (In-House)                      Plan:         CoughPresentation c/w viral URI, flu swab is negative. Pt is advised allergy meds should help with Sx but definitive Tx is time and rest. If Sx worsen or do not resolve then Abx can be considered at that time.           Orders:       [id number]  Infectious agent antigen detection by immunoassay; Influenza  (In-House)            68042  Infectious agent antigen detection by immunoassay; Influenza  (In-House)                  Charge  Capture:         Primary Diagnosis:     R05  Cough           Orders:      62841  Office/outpatient visit; established patient, level 3  (In-House)            34496-85  Infectious agent antigen detection by immunoassay; Influenza  (In-House)            63530  Infectious agent antigen detection by immunoassay; Influenza  (In-House)

## 2021-05-18 NOTE — PROGRESS NOTES
Maldonado Hammer 1970     Office/Outpatient Visit    Visit Date: Sat, Feb 23, 2019 09:25 am    Provider: Ely Garduno N.P. (Assistant: Solo Dukes)    Location: [facility]        Electronically signed by Ely Garduno N.P. on  02/23/2019 09:56:50 AM                             SUBJECTIVE:        CC:     Rudolph is a 49 year old White male.  presents today due to sinus pressure, stuffy nose for about a week (would like if a flu shot today if he can get it)         HPI:         Patient to be evaluated for upper respiratory illness.  These have been present for the past one week.  The symptoms include dry cough, headache, sinus pain/pressure and runny nose.  He denies ear complaints or fever.  He has already tried to relieve the symptoms with Dayquil, Mucinex.      ROS:     CONSTITUTIONAL:  Negative for chills and fever.      EYES:  Negative for blurred vision and eye drainage.      E/N/T:  Positive for sinus pressure.   Negative for ear pain or sore throat.      CARDIOVASCULAR:  Negative for chest pain and palpitations.      RESPIRATORY:  Positive for recent cough ( typically dry ).   Negative for dyspnea or frequent wheezing.      GASTROINTESTINAL:  Negative for abdominal pain and vomiting.          PMH/FMH/SH:     Last Reviewed on 1/08/2018 02:44 PM by Kaiden Hendricks    Past Medical History:         Hospitalizations: AN INFECTION         Surgical History:         Positive for    Vasectomy;         Family History:         Positive for Hypertension.      Positive for Colon Cancer.      Positive for Asthma.          Social History:     Occupation: ELECTRICAL WORK;     Marital Status:          Tobacco/Alcohol/Supplements:     Last Reviewed on 1/08/2018 02:44 PM by Kaiden Hendricks    Tobacco: Current Smoker: He currently smokes every day, 1 pack per day.          Alcohol: Frequency:    RARE;     Caffeine:  He admits to consuming caffeine via coffee ( 4 servings per day ) and soda  ( 2 servings per day ).          Substance Abuse History:     Last Reviewed on 1/08/2018 02:44 PM by Kaiden Hendricks        Mental Health History:     Last Reviewed on 1/08/2018 02:44 PM by Kaiden Hendricks        Communicable Diseases (eg STDs):     Last Reviewed on 1/08/2018 02:44 PM by Kaiden Hendricks            Current Problems:     Last Reviewed on 1/08/2018 02:44 PM by Kaiden Hendricks    Cigarette smoking         Immunizations:     None        Allergies:     Last Reviewed on 1/08/2018 02:44 PM by Kaiden Hendricks    Penicillins:        Current Medications:     Last Reviewed on 1/08/2018 02:44 PM by Kaiden Hendricks    None        OBJECTIVE:        Vitals:         Current: 2/23/2019 9:28:36 AM    Ht:  5 ft, 8.5 in;  Wt: 172.4 lbs;  BMI: 25.8    T: 98.8 F (oral);  BP: 113/69 mm Hg (left arm, sitting);  P: 78 bpm (left arm (BP Cuff), sitting);  sCr: 1.13 mg/dL;  GFR: 74.60        Exams:     PHYSICAL EXAM:     GENERAL: well developed, well nourished;  no apparent distress;     EYES: PERRL, EOMI     E/N/T: EARS: external auditory canal normal;  both TMs are dull;  NOSE: turbinates are mildly swollen bilaterally;  bilateral maxillary and bilateral frontal sinus tenderness present; OROPHARYNX: oral mucosa is normal; posterior pharynx shows no exudate and post nasal drip;     NECK: range of motion is normal; trachea is midline;     RESPIRATORY: normal respiratory rate and pattern with no distress; normal breath sounds with no rales, rhonchi, wheezes or rubs;     CARDIOVASCULAR: normal rate; rhythm is regular;     MUSCULOSKELETAL: normal gait;     NEUROLOGIC: mental status: alert and oriented x 3; GROSSLY INTACT     PSYCHIATRIC: appropriate affect and demeanor;         Procedures:     Influenza vaccination     1. Influenza, seasonal (children 3 years to adult): 0.5 ml unit dose given IM in the left upper arm; administered by and;  lot number [id number]; expires 6/30/19 Regarding  contraindications to an Influenza vaccine:        No contraindications were noted.              ASSESSMENT           465.9   J06.9  Acute upper respiratory infection              DDx:     V04.81   Z23  Influenza vaccination              DDx:     305.1   F17.200  Cigarette smoking              DDx:         ORDERS:         Meds Prescribed:       Pseudoephedrine HCl 30mg Tablet 1 tablet every 6 hrs prn.  #30 (Thirty) tablet(s) Refills: 0       Fluticasone Propionate 50mcg/1actuation Nasal Spray 1 spray each nostil daily  #16 (Sixteen) gm Refills: 0       Zithromax (Azithromycin) 250mg Tablet 2 tablets on day 1, then 1 tablet on days 2-5.  #6 (Six) tablet(s) Refills: 0         Procedures Ordered:       14035  Immunization administration; one vaccine  (In-House)           Other Orders:       [id number]  Influenza virus vaccine, quadrivalent, split virus, preservative free 3 years of age & older  (In-House)         4004F  Pt scrnd tobacco use rcvd tobacco cessation talk  (In-House)                   PLAN:          Acute upper respiratory infection         RECOMMENDATIONS given include: Push Fluids, Rest, Follow up if no improvement or worsening symptoms like high fevers, vomiting, weakness, or increasing shortness of air.    .      FOLLOW-UP: Schedule follow-up appointments on a p.r.n. basis. for Annual Checkup           Prescriptions:       Pseudoephedrine HCl 30mg Tablet 1 tablet every 6 hrs prn.  #30 (Thirty) tablet(s) Refills: 0       Fluticasone Propionate 50mcg/1actuation Nasal Spray 1 spray each nostil daily  #16 (Sixteen) gm Refills: 0       Zithromax (Azithromycin) 250mg Tablet 2 tablets on day 1, then 1 tablet on days 2-5.  #6 (Six) tablet(s) Refills: 0          Influenza vaccination         IMMUNIZATIONS given today: Influenza Quadrivalent psv free shot 3 & up.            Orders:       51861  Influenza virus vaccine, quadrivalent, split virus, preservative free 3 years of age & older  (In-House)         43947   Immunization administration; one vaccine  (In-House)            Cigarette smoking         RECOMMENDATIONS given include: Counseled on smoking cessation and advised of the benefits to patient's health if he were to stop smoking. Counseling for less than 3 minutes.            Orders:       4004F  Pt scrnd tobacco use rcvd tobacco cessation talk  (In-House)               Patient Recommendations:        For  Acute upper respiratory infection:     Schedule follow-up appointments as needed.          For  Cigarette smoking:         Stop smoking.              CHARGE CAPTURE           **Please note: ICD descriptions below are intended for billing purposes only and may not represent clinical diagnoses**        Primary Diagnosis:         465.9 Acute upper respiratory infection            J06.9    Acute upper respiratory infection, unspecified              Orders:          21676   Office/outpatient visit; established patient, level 3  (In-House)           V04.81 Influenza vaccination            Z23    Encounter for immunization              Orders:          21221   Influenza virus vaccine, quadrivalent, split virus, preservative free 3 years of age & older  (In-House)             74341   Immunization administration; one vaccine  (In-House)           305.1 Cigarette smoking            F17.200    Nicotine dependence, unspecified, uncomplicated              Orders:          4004F   Pt scrnd tobacco use rcvd tobacco cessation talk  (In-House)

## 2021-05-18 NOTE — PROGRESS NOTES
Maldonado Hammer  1970     Office/Outpatient Visit    Visit Date: Thu, Nov 14, 2019 03:52 pm    Provider: Ely Garduno N.P. (Assistant: Irene Church MA)    Location: [facility]        Electronically signed by Ely Garduno N.P. on  11/18/2019 02:29:33 PM                             Subjective:        CC: Rudolph is a 49 year old White male.  headache, congestion, cough PT STATES THE ONLY THING HES TAKING IS FLOMAX         HPI:       (Mild)     Upper respiratory illness noted.  These have been present for the past 3 days.  The symptoms include chest congestion, productive cough, headache and sinus pain/pressure.  He denies fever or wheezing.  He reports recent exposure to illness from wife with bronchitis.  He has already tried to relieve the symptoms with Dayquil.      ROS:     CONSTITUTIONAL:  Negative for chills and fever.      EYES:  Negative for blurred vision and eye drainage.      E/N/T:  Positive for sinus pressure.   Negative for ear pain.      CARDIOVASCULAR:  Negative for chest pain and palpitations.      RESPIRATORY:  Positive for recent cough.   Negative for dyspnea or frequent wheezing.          Past Medical History / Family History / Social History:         Last Reviewed on 10/30/2019 04:47 PM by Ely Garduno    Past Medical History:             PAST MEDICAL HISTORY         Renal Stones         PREVENTIVE HEALTH MAINTENANCE             COLORECTAL CANCER SCREENING: Up to date (colonoscopy q10y; sigmoidoscopy q5y; Cologuard q3y) was last done while in his 20s; colonoscopy with the following abnormalities noted-- Polyp(s)         Surgical History:         Positive for    Vasectomy: 1997; ;         Family History:     Father: Cause of death was CHF     Mother: Cause of death was colon CA         Social History:     Occupation: ;     Marital Status:      Children: 3 children         Tobacco/Alcohol/Supplements:     Last Reviewed on 11/06/2019 01:04 PM by Giovanna Box  N    Tobacco: Current Smoker: He currently smokes every day, 1 pack per day.          Alcohol: Frequency:    RARE;     Caffeine:  He admits to consuming caffeine via coffee ( 6 servings per day ) and soda ( 2 servings per day ).          Substance Abuse History:     Last Reviewed on 1/08/2018 02:44 PM by Kaiden Hendricks        Mental Health History:     Last Reviewed on 1/08/2018 02:44 PM by Kaiden Hendricks        Communicable Diseases (eg STDs):     Last Reviewed on 1/08/2018 02:44 PM by Kaiden Hendricks        Current Problems:     Last Reviewed on 1/08/2018 02:44 PM by Kaiden Hendricks    Nicotine dependence, unspecified, uncomplicated    Cigarette smoking    History of colonic polyps    Personal history of colonic polyps    Urinary calculus, unspecified    Ureteral stone        Immunizations:     Tetanus immune globulin (TIG), human, IM 1/1/2014    Fluzone Quadrivalent (3+ years) 2/23/2019    Fluzone Quadrivalent (3+ years) 10/30/2019        Allergies:     Last Reviewed on 11/06/2019 01:04 PM by Giovanna Box    Penicillins:          Current Medications:     Last Reviewed on 11/06/2019 01:04 PM by Giovanna Box    Flomax 0.4mg Capsules [Take 1 capsule(s) by mouth daily]    Hydrocodone/Acetaminophen 5mg/325mg Tablet [1 or 2 tabs po q 6 hours prn]    Zofran ODT 4mg Tablets, Orally Disintegrating [1 tab every 8 hours PRN for nausea/vomiting]        Objective:        Vitals:         Current: 11/14/2019 4:00:19 PM    Ht:  5 ft, 8.5 in;  Wt: 168.8 lbs;  BMI: 25.3T: 98.6 F (oral);  BP: 110/69 mm Hg (left arm, sitting);  P: 87 bpm (left arm (BP Cuff), sitting);  sCr: 1.41 mg/dL;  GFR: 59.25O2 Sat: 97 % (room air)        Exams:     PHYSICAL EXAM:     GENERAL: well developed, well nourished;  no apparent distress;     EYES: PERRL, EOMI     E/N/T: EARS: external auditory canal normal;  both TMs are dull;  NOSE: normal turbinates; bilateral maxillary sinus tenderness present; OROPHARYNX: oral  mucosa is normal; posterior pharynx shows no exudate and post nasal drip;     NECK: range of motion is normal; trachea is midline;     RESPIRATORY: normal appearance and symmetric expansion of chest wall; normal respiratory rate and pattern with no distress; normal breath sounds with no rales, rhonchi, wheezes or rubs;     CARDIOVASCULAR: normal rate; rhythm is regular;     NEUROLOGIC: mental status: alert and oriented x 3; GROSSLY INTACT     PSYCHIATRIC: appropriate affect and demeanor;         Assessment:         466.0   Acute bronchitis   (Mild)         ORDERS:         Meds Prescribed:       [New Rx] predniSONE 10 mg oral tablet [take 1 tablet (10 mg) by oral route 3 times per day], #15 (fifteen) tablets, Refills: 0 (zero)       [New Rx] Bromfed DM 2-30-10 mg/5 mL oral Syrup [take 10 milliliters by oral route every 4 hours as needed for cough], #400 (four hundred) milliliters, Refills: 0 (zero)                 Plan:         Acute bronchitisWill call next week if no improvement and will send in antibiotic prescription.        RECOMMENDATIONS given include: Push Fluids, Rest, Follow up if no improvement or worsening symptoms like high fevers, vomiting, weakness, or increasing shortness of air.    .  MIPS Vaccines Flu and Pneumonia updated in Shot record     FOLLOW-UP: Chronic visit follow up           Prescriptions:       [New Rx] predniSONE 10 mg oral tablet [take 1 tablet (10 mg) by oral route 3 times per day], #15 (fifteen) tablets, Refills: 0 (zero)       [New Rx] Bromfed DM 2-30-10 mg/5 mL oral Syrup [take 10 milliliters by oral route every 4 hours as needed for cough], #400 (four hundred) milliliters, Refills: 0 (zero)             Charge Capture:         Primary Diagnosis:     466.0  Acute bronchitis           Orders:      69874  Office/outpatient visit; established patient, level 3  (In-House)

## 2021-05-18 NOTE — PROGRESS NOTES
Harman Maldonado ALASDilan 1970     Office/Outpatient Visit    Visit Date: Wed, Mar 20, 2019 03:58 pm    Provider: Ely Garduno N.P. (Assistant: Giovanna Box RN)    Location: [facility]        Electronically signed by Ely Garduno N.P. on  03/20/2019 04:36:55 PM                             SUBJECTIVE:        CC:     Rudolph is a 49 year old White male.  Preventative Exam         HPI:         Health checkup noted.  He performs testicular self-exams occasionally.  Depression screen is performed and is negative.  He is current with his influenza and Td(5 years ago) immunization.      Smoking status: Rudolph currently smokes cigarettes.          PHQ-9 Depression Screening: Completed form scanned and in chart; Total Score 1 Alcohol Consumption Screening: Completed form scanned and in chart; Total Score 3     ROS:     CONSTITUTIONAL:  Negative for chills and fever.      EYES:  Negative for blurred vision and eye drainage.      E/N/T:  Negative for ear pain and sore throat.      CARDIOVASCULAR:  Negative for chest pain and palpitations.      RESPIRATORY:  Negative for dyspnea and frequent wheezing.      GASTROINTESTINAL:  Negative for abdominal pain and vomiting.      GENITOURINARY:  Negative for dysuria and polyuria.      MUSCULOSKELETAL:  Negative for joint stiffness and myalgias.      INTEGUMENTARY:  Negative for rash.      NEUROLOGICAL:  Positive for numbness and tingling in bilateral hands at nights. Improved by wearing brace..   Negative for dizziness or headaches.      HEMATOLOGIC/LYMPHATIC:  Negative for easy bruising and lymphadenopathy.      ENDOCRINE:  Negative for polydipsia and polyphagia.      PSYCHIATRIC:  Negative for depression and suicidal thoughts.          PMH/FMH/SH:     Last Reviewed on 3/20/2019 04:10 PM by Ely Garduno    Past Medical History:             PAST MEDICAL HISTORY         Renal Stones         PREVENTIVE HEALTH MAINTENANCE             COLORECTAL CANCER SCREENING: Up to date  (colonoscopy q10y; sigmoidoscopy q5y; Cologuard q3y) was last done while in his 20s; colonoscopy with the following abnormalities noted-- Polyp(s)         Surgical History:         Positive for    Vasectomy: 1997; ;         Family History:     Father: Cause of death was CHF     Mother: Cause of death was colon CA         Social History:     Occupation: ;     Marital Status:      Children: 3 children         Tobacco/Alcohol/Supplements:     Last Reviewed on 3/20/2019 04:02 PM by Giovanna Box    Tobacco: Current Smoker: He currently smokes every day, 1 pack per day.          Alcohol: Frequency:    RARE;     Caffeine:  He admits to consuming caffeine via coffee ( 6 servings per day ) and soda ( 2 servings per day ).          Substance Abuse History:     Last Reviewed on 1/08/2018 02:44 PM by Kaiden Hendricks        Mental Health History:     Last Reviewed on 1/08/2018 02:44 PM by Kaiden Hendricks        Communicable Diseases (eg STDs):     Last Reviewed on 1/08/2018 02:44 PM by Kaiden Hendricks            Current Problems:     Last Reviewed on 1/08/2018 02:44 PM by Kaiden Hendricks    History of colonic polyps     Cigarette smoking     Carpal tunnel syndrome     Screening for depression     Acute upper respiratory infection         Immunizations:     Tetanus immune globulin (TIG), human, IM 1/1/2014     Fluzone Quadrivalent (3+ years) 2/23/2019         Allergies:     Last Reviewed on 3/20/2019 04:02 PM by Giovanna Box    Penicillins:        Current Medications:     Last Reviewed on 3/20/2019 04:02 PM by Giovanna Box    None        OBJECTIVE:        Vitals:         Current: 3/20/2019 4:04:52 PM    Ht:  5 ft, 8.5 in;  Wt: 172.8 lbs;  BMI: 25.9    T: 98.3 F (oral);  BP: 112/74 mm Hg (right arm, sitting);  P: 78 bpm (right arm (BP Cuff), sitting);  sCr: 1.13 mg/dL;  GFR: 74.68        Exams:     PHYSICAL EXAM:     GENERAL: well developed, well nourished;  no apparent  distress;     EYES: PERRL, EOMI     E/N/T: EARS: external auditory canal normal;  bilateral TMs are normal;  NOSE: normal turbinates; no sinus tenderness; OROPHARYNX: oral mucosa is normal; posterior pharynx shows no exudate;     NECK: range of motion is normal; trachea is midline;     RESPIRATORY: normal respiratory rate and pattern with no distress; normal breath sounds with no rales, rhonchi, wheezes or rubs;     CARDIOVASCULAR: normal rate; rhythm is regular;     GASTROINTESTINAL: nontender; normal bowel sounds; no organomegaly;     LYMPHATIC: no enlargement of cervical or facial nodes;     SKIN:  no significant rashes or lesions; no suspicious moles;     MUSCULOSKELETAL: normal gait; normal range of motion of all major muscle groups; no limb or joint pain with range of motion;     NEUROLOGIC: mental status: alert and oriented x 3; GROSSLY INTACT     PSYCHIATRIC: appropriate affect and demeanor;         ASSESSMENT           V70.0   Z00.00  Health checkup              DDx:     V79.0   Z13.89  Screening for depression              DDx:     V12.72   Z86.010  History of colonic polyps              DDx:     305.1   F17.200  Cigarette smoking              DDx:     354.0   G56.03  Carpal tunnel syndrome              DDx:         ORDERS:         Lab Orders:       [id number]  [address] CBC w/o diff  (Send-Out)         56583  [facility] Comp. Metabolic Panel  (Send-Out)         97466  [facility] Lipid Panel  (Send-Out)           Procedures Ordered:       REFER  Referral to Specialist or Other Facility  (Send-Out)           Other Orders:       4004F  Pt scrnd tobacco use rcvd tobacco cessation talk  (In-House)           Depression screen negative  (In-House)           Negative EtOH screen  (In-House)           Calculated BMI above the upper parameter and a follow-up plan was documented in the medical record  (In-House)                   PLAN:          Health checkup     LABORATORY:  Labs ordered to be performed  today include CBC W/O DIFF, Comprehensive metabolic panel, and lipid panel.      FOLLOW-UP: Schedule follow-up appointments on a p.r.n. basis. for Annual Checkup     COUNSELING was provided today regarding the following topics: healthy eating habits, regular exercise, testicular self-exam, and ADVISED TO SEE AN EYE DOCTOR AND A DENTIST REGULARLY.            Orders:       [id number]  CB2 - [facility] CBC w/o diff  (Send-Out)         82709  COMP - [facility] Comp. Metabolic Panel  (Send-Out)         26588  LPDP - [facility] Lipid Panel  (Send-Out)             Patient Education Handouts:       Physical Exam 40-49 year, Male           Screening for depression     MIPS PHQ-9 Depression Screening Completed form scanned and in chart; Total Score 1 Negative alcohol screen     BMI Elevated - Follow-Up Plan: Insignificant elevation           Orders:         Depression screen negative  (In-House)           Negative EtOH screen  (In-House)           Calculated BMI above the upper parameter and a follow-up plan was documented in the medical record  (In-House)            History of colonic polyps With family history of colon CA in mother and history of colon polyps in his 20s, recommend consideration for repeat colonoscopy. Will refer to gen surg.         REFERRALS:  Referral initiated to a general surgeon ( Dr. Ross Wood ).            Orders:       REFER  Referral to Specialist or Other Facility  (Send-Out)            Cigarette smoking         RECOMMENDATIONS given include: Counseled on smoking cessation and advised of the benefits to patient's health if he were to stop smoking. Counseling for less than 3 minutes.            Orders:       4004F  Pt scrnd tobacco use rcvd tobacco cessation talk  (In-House)            Carpal tunnel syndrome         RECOMMENDATIONS given include: Braces at night. Frequent stretches. and Follow up for worsening symptoms..              Patient Recommendations:        For  Health checkup:     Limit dietary  intake of fat (especially saturated fat) and cholesterol.  Eat a variety of foods, including plenty of fruits, vegetables, and grain containg fiber, limit fat intake to 30% of total calories. Balance caloric intake with energy expended. Maintaining regular physical activity is advised to help prevent heart disease, hypertension, diabetes, and obesity.    Testicular cancer is the #1 cancer in men ages 15-35.  You should regularly examine your testicles for knots, lumps, or tenderness. Testicular pain, even if not associated with any masses, needs to be evaluated by your doctor!  Schedule follow-up appointments as needed.          For  Cigarette smoking:         Stop smoking.              CHARGE CAPTURE           **Please note: ICD descriptions below are intended for billing purposes only and may not represent clinical diagnoses**        Primary Diagnosis:         V70.0 Health checkup            Z00.00    Encounter for general adult medical examination without abnormal findings              Orders:          74756   Preventive medicine, established patient, age 40-64 years  (In-House)           V79.0 Screening for depression            Z13.89    Encounter for screening for other disorder              Orders:             Depression screen negative  (In-House)                Negative EtOH screen  (In-House)                Calculated BMI above the upper parameter and a follow-up plan was documented in the medical record  (In-House)           V12.72 History of colonic polyps            Z86.010    Personal history of colonic polyps    305.1 Cigarette smoking            F17.200    Nicotine dependence, unspecified, uncomplicated              Orders:          4004F   Pt scrnd tobacco use rcvd tobacco cessation talk  (In-House)           354.0 Carpal tunnel syndrome            G56.03    Carpal tunnel syndrome, bilateral upper limbs

## 2021-05-18 NOTE — PROGRESS NOTES
Maldonado HammerDilan 1970     Office/Outpatient Visit    Visit Date: Mon, Jan 8, 2018 02:31 pm    Provider: Kaiden Hendricks MD (Assistant: Alma Rosa Johnson LPN)    Location: [facility]        Electronically signed by Kaiden Hendricks MD on  01/08/2018 07:45:04 PM                             SUBJECTIVE:        CC: cough         HPI:     Rudolph is in today for evaluation of cough.  He started feeling kind of bad on Friday, but his symptoms progressed over the weekend.  He says that last night was the worst so far.  He has had some subjective fever and chills at home.  He feels achy and really does not feel good.  His wife actually drove him here today because he is weak.  He is not throwing up.  He has not wanted to eat today.  He did take some Aleve today.     ROS:         CARDIOVASCULAR:  Negative for chest pain and palpitations.      GASTROINTESTINAL:  Negative for abdominal pain, nausea and vomiting.      INTEGUMENTARY:  Negative for atypical mole(s) and rash.          PM/[facility]/:     Last Reviewed on 1/08/2018 02:44 PM by Kaiden Hendricks    Past Medical History:         Hospitalizations: AN INFECTION         Surgical History:         Positive for    Vasectomy;         Family History:         Positive for Hypertension.      Positive for Colon Cancer.      Positive for Asthma.          Social History:     Occupation: ELECTRICAL WORK;     Marital Status:          Tobacco/Alcohol/Supplements:     Last Reviewed on 1/08/2018 02:44 PM by Kaiden Hendricks    Tobacco: Current Smoker: He currently smokes every day, 1 pack per day.          Alcohol: Frequency:    RARE;     Caffeine:  He admits to consuming caffeine via coffee ( 4 servings per day ) and soda ( 2 servings per day ).          Substance Abuse History:     Last Reviewed on 1/08/2018 02:44 PM by Kaiden Hendricks        Mental Health History:     Last Reviewed on 1/08/2018 02:44 PM by Kaiden Hendricks        Communicable  Diseases (eg STDs):     Last Reviewed on 1/08/2018 02:44 PM by Kaiden Hendricks            Current Problems:     Last Reviewed on 1/08/2018 02:44 PM by Kaiden Hendricks    Cigarette smoking         Immunizations:     None        Allergies:     Last Reviewed on 1/08/2018 02:44 PM by Kaiden Hendricks    Penicillins:        Current Medications:     Last Reviewed on 1/08/2018 02:44 PM by Kaiden Hendricks      No Known Medications.         OBJECTIVE:        Vitals:         Current: 1/8/2018 2:33:42 PM    Ht:  5 ft, 8.5 in;  Wt: 168.8 lbs;  BMI: 25.3    T: 98.1 F (oral);  BP: 122/77 mm Hg (left arm, sitting);  P: 69 bpm (left arm (BP Cuff), sitting);  sCr: 1.13 mg/dL;  GFR: 75.50        Exams:     PHYSICAL EXAM:     GENERAL: Vitals recorded well developed, well nourished;     EYES: extraocular movements intact; conjunctiva and cornea are normal; PERRL;     E/N/T: EARS:  normal external auditory canals and tympanic membranes;  grossly normal hearing; OROPHARYNX:  normal mucosa, dentition, gingiva, and posterior pharynx;     NECK: range of motion is normal; thyroid is non-palpable;     RESPIRATORY: normal respiratory rate and pattern with no distress; decreased breath sounds;     CARDIOVASCULAR: normal rate; rhythm is regular;  no systolic murmur;     GASTROINTESTINAL: nontender; normal bowel sounds; no masses;     SKIN:  no significant rashes or lesions; no suspicious moles;         Lab/Test Results:             Influenza A:  Positive (01/08/2018),     Influenza B:  Negative (01/08/2018),     Performed by::  jigna (01/08/2018),             ASSESSMENT           487.1   J10.1  Influenza, with other respiratory manifestation              DDx:         ORDERS:         Meds Prescribed:       Tamiflu (Oseltamivir) 75mg Capsules Take 1 capsule(s) by mouth bid for 5 days  #10 (Ten) capsule(s) Refills: 0       Bromfed-DM (Brompheniramine/Dextromethorphan/Pseudoephedrine) Syrup 1  to 2  tsp po every 4-6 hrs prn  cough/congestion.  #4 (Four) oz Refills: 1         Lab Orders:       [id number]  Infectious agent antigen detection by immunoassay; Influenza  (In-House)         84621-62  Infectious agent antigen detection by immunoassay; Influenza  (In-House)           Other Orders:       4004F  Pt scrnd tobacco use rcvd tobacco cessation talk  (In-House)                   PLAN:          Influenza, with other respiratory manifestation     LABORATORY:  Labs ordered to be performed today include Flu A&B Flu A Flu B.      RECOMMENDATIONS given include: Today, we will cover Rudolph as noted below.  I would expect him to have gradual improvement from these symptoms.  No other changes are made.  He will miss work tomorrow and return tentatively on Wednesday..  MIPS Smoking cessation encouraged. Counseling for less than 3 minutes.            Prescriptions:       Tamiflu (Oseltamivir) 75mg Capsules Take 1 capsule(s) by mouth bid for 5 days  #10 (Ten) capsule(s) Refills: 0       Bromfed-DM (Brompheniramine/Dextromethorphan/Pseudoephedrine) Syrup 1  to 2  tsp po every 4-6 hrs prn cough/congestion.  #4 (Four) oz Refills: 1           Orders:       59496  Infectious agent antigen detection by immunoassay; Influenza  (In-House)         13801-47  Infectious agent antigen detection by immunoassay; Influenza  (In-House)         4004F  Pt scrnd tobacco use vd tobacco cessation talk  (In-House)             Patient Education Handouts:       Influenza              CHARGE CAPTURE           **Please note: ICD descriptions below are intended for billing purposes only and may not represent clinical diagnoses**        Primary Diagnosis:         487.1 Influenza, with other respiratory manifestation            J10.1    Influenza due to other identified influenza virus with other respiratory manifestations              Orders:          74205   Office/outpatient visit; established patient, level 3  (In-House)             61905   Infectious agent antigen detection  by immunoassay; Influenza  (In-House)             43140 -59  Infectious agent antigen detection by immunoassay; Influenza  (In-House)             4004F   Pt scrnd tobacco use rcvd tobacco cessation talk  (In-House)

## 2021-05-19 NOTE — PROGRESS NOTES
Maldonado HammerDilan 1970     Office/Outpatient Visit    Visit Date: Wed, Nov 6, 2019 01:02 pm    Provider: lEy Garduno N.P. (Assistant: Giovanna Box RN)    Location: [facility]        Electronically signed by Ely Garduno N.P. on  11/06/2019 01:39:35 PM                             SUBJECTIVE:        CC:     Rudolph is a 49 year old White male.  He is here today following a transition of care from the emergency department ( [facility] 11/5/19  for passing out at work ).          HPI: Rudolph presents for follow up after ER visit yesterday. He was seen last month for hematuria without signs of infection on urinalysis. He notes that he started to have left flank pain yesterday that radiated to LLQ of abdomen. He was taking Flomax and Hydrocodone that was prescribed to him from 12/2018 for renal stone. He then passed out at work and was taken to ER. Was found to have 2 mm left ureteral stone. Was seen at [facility] in [facility]. Was advised plenty of fluids and using urine strainer. They thought stone would pass on its own. Has never been seen by urology.             ROS:     CONSTITUTIONAL:  Negative for chills and fever.      CARDIOVASCULAR:  Negative for chest pain and palpitations.      RESPIRATORY:  Negative for dyspnea and frequent wheezing.      GASTROINTESTINAL:  Positive for abdominal pain ( LLQ ), nausea and left flank pain.      GENITOURINARY:  Positive for dysuria and hematuria.          PMH/FMH/SH:     Last Reviewed on 10/30/2019 04:47 PM by Ely Garduno    Past Medical History:             PAST MEDICAL HISTORY         Renal Stones         PREVENTIVE HEALTH MAINTENANCE             COLORECTAL CANCER SCREENING: Up to date (colonoscopy q10y; sigmoidoscopy q5y; Cologuard q3y) was last done while in his 20s; colonoscopy with the following abnormalities noted-- Polyp(s)         Surgical History:         Positive for    Vasectomy: 1997; ;          Family History:     Father: Cause of death was CHF     Mother: Cause of death was colon CA         Social History:     Occupation: ;     Marital Status:      Children: 3 children         Tobacco/Alcohol/Supplements:     Last Reviewed on 10/30/2019 04:25 PM by Renita Johnson    Tobacco: Current Smoker: He currently smokes every day, 1 pack per day.          Alcohol: Frequency:    RARE;     Caffeine:  He admits to consuming caffeine via coffee ( 6 servings per day ) and soda ( 2 servings per day ).          Substance Abuse History:     Last Reviewed on 1/08/2018 02:44 PM by Kaiden Hendricks        Mental Health History:     Last Reviewed on 1/08/2018 02:44 PM by Kaiden Hendricks        Communicable Diseases (eg STDs):     Last Reviewed on 1/08/2018 02:44 PM by Kaiden Hendricks            Current Problems:     Last Reviewed on 1/08/2018 02:44 PM by Kaiden Hendricks    History of colonic polyps     Cigarette smoking     Hematemesis         Immunizations:     Tetanus immune globulin (TIG), human, IM 1/1/2014     Fluzone Quadrivalent (3+ years) 2/23/2019     Fluzone Quadrivalent (3+ years) 10/30/2019         Allergies:     Last Reviewed on 11/06/2019 01:04 PM by Giovanna Box    Penicillins:        Current Medications:     Last Reviewed on 11/06/2019 01:04 PM by Giovanna Box    Flomax 0.4mg Capsules Take 1 capsule(s) by mouth daily     Hydrocodone/Acetaminophen 5mg/325mg Tablet 1 or 2 tabs po q 6 hours prn         OBJECTIVE:        Vitals:         Current: 11/6/2019 1:06:16 PM    Ht:  5 ft, 8.5 in;  Wt: 172.6 lbs;  BMI: 25.9T: 98.2 F (oral);  BP: 131/75 mm Hg (left arm, sitting);  P: 66 bpm (left arm (BP Cuff), sitting);  sCr: 1.41 mg/dL;  GFR: 59.82        Exams:     PHYSICAL EXAM:     GENERAL: well developed, well nourished;  no apparent distress;     RESPIRATORY: normal respiratory rate and pattern with no distress; normal breath sounds with no rales, rhonchi, wheezes or  rubs;     CARDIOVASCULAR: normal rate; rhythm is regular;     GASTROINTESTINAL: nontender; normal bowel sounds; no organomegaly;     NEUROLOGIC: mental status: alert and oriented x 3; GROSSLY INTACT     PSYCHIATRIC: appropriate affect and demeanor;         ASSESSMENT            592.1    N20.9    Ureteral stone      305.1    F17.200    Cigarette smoking          ORDERS:         Meds Prescribed:       Refill of: Hydrocodone/Acetaminophen 5mg/325mg Tablet 1 or 2 tabs po q 6 hours prn  #15 (Fifteen) tablet(s) Refills: 0       Zofran ODT (Ondansetron HCl) 4mg Tablets, Orally Disintegrating 1 tab every 8 hours PRN for nausea/vomiting  #20 (Twenty) tablet(s) Refills: 0         Radiology/Test Orders:       3017F  Colorectal CA screen results documented and reviewed (PV)   (In-House)              Procedures Ordered:       REFER  Referral to Specialist or Other Facility   (Send-Out)              Other Orders:       4004F  Pt scrnd tobacco use rcvd tobacco cessation talk   (In-House)                      PLAN:         Ureteral stone Drink plenty of fluids, moderate calcium intake, low oxalate intake. Info given. Work on cutting back on coffee.          REFERRALS:  Referral initiated to a urologist.  MIPS Vaccines Flu and Pneumonia updated in Shot record Colorectal Cancer Screening is up to date and the results are in the chart     FOLLOW-UP: for Annual Checkup           Prescriptions:       Refill of: Hydrocodone/Acetaminophen 5mg/325mg Tablet 1 or 2 tabs po q 6 hours prn  #15 (Fifteen) tablet(s) Refills: 0       Zofran ODT (Ondansetron HCl) 4mg Tablets, Orally Disintegrating 1 tab every 8 hours PRN for nausea/vomiting  #20 (Twenty) tablet(s) Refills: 0           Orders:       REFER  Referral to Specialist or Other Facility   (Send-Out)            3017F  Colorectal CA screen results documented and reviewed (PV)   (In-House)              Cigarette smoking         RECOMMENDATIONS given include: Counseled on smoking cessation and  advised of the benefits to patient's health if he were to stop smoking. Counseling for less than 3 minutes.            Orders:       4004F  Pt scrnd tobacco use rcvd tobacco cessation talk   (In-House)                  Patient Recommendations:        For  Cigarette smoking:         Stop smoking.              CHARGE CAPTURE            **Please note: ICD descriptions below are intended for billing purposes only and may not represent clinical diagnoses**        Primary Diagnosis:         592.1  Ureteral stone         N20.9 Urinary calculus, unspecified          Orders:        49981    Office/outpatient visit; established patient, level 3   (In-House)              3017F    Colorectal CA screen results documented and reviewed (PV)   (In-House)              305.1  Cigarette smoking         F17.200 Nicotine dependence, unspecified, uncomplicated          Orders:        4004F    Pt scrnd tobacco use rcvd tobacco cessation talk   (In-House)

## 2021-07-01 VITALS
SYSTOLIC BLOOD PRESSURE: 122 MMHG | WEIGHT: 168.8 LBS | DIASTOLIC BLOOD PRESSURE: 77 MMHG | HEART RATE: 69 BPM | TEMPERATURE: 98.1 F | BODY MASS INDEX: 25 KG/M2 | HEIGHT: 69 IN

## 2021-07-01 VITALS
HEART RATE: 66 BPM | TEMPERATURE: 98.2 F | DIASTOLIC BLOOD PRESSURE: 75 MMHG | WEIGHT: 172.6 LBS | BODY MASS INDEX: 25.56 KG/M2 | SYSTOLIC BLOOD PRESSURE: 131 MMHG | HEIGHT: 69 IN

## 2021-07-01 VITALS
BODY MASS INDEX: 25.53 KG/M2 | TEMPERATURE: 98.8 F | WEIGHT: 172.4 LBS | SYSTOLIC BLOOD PRESSURE: 113 MMHG | HEART RATE: 78 BPM | HEIGHT: 69 IN | DIASTOLIC BLOOD PRESSURE: 69 MMHG

## 2021-07-01 VITALS
BODY MASS INDEX: 25.92 KG/M2 | DIASTOLIC BLOOD PRESSURE: 76 MMHG | TEMPERATURE: 98.3 F | WEIGHT: 175 LBS | HEART RATE: 87 BPM | SYSTOLIC BLOOD PRESSURE: 124 MMHG | HEIGHT: 69 IN

## 2021-07-01 VITALS
HEIGHT: 69 IN | HEART RATE: 87 BPM | WEIGHT: 168.8 LBS | DIASTOLIC BLOOD PRESSURE: 69 MMHG | SYSTOLIC BLOOD PRESSURE: 110 MMHG | OXYGEN SATURATION: 97 % | BODY MASS INDEX: 25 KG/M2 | TEMPERATURE: 98.6 F

## 2021-07-01 VITALS
BODY MASS INDEX: 25.59 KG/M2 | HEART RATE: 78 BPM | HEIGHT: 69 IN | SYSTOLIC BLOOD PRESSURE: 112 MMHG | DIASTOLIC BLOOD PRESSURE: 74 MMHG | TEMPERATURE: 98.3 F | WEIGHT: 172.8 LBS

## 2021-07-02 VITALS
TEMPERATURE: 98.5 F | WEIGHT: 174.8 LBS | SYSTOLIC BLOOD PRESSURE: 138 MMHG | DIASTOLIC BLOOD PRESSURE: 90 MMHG | HEIGHT: 69 IN | HEART RATE: 70 BPM | BODY MASS INDEX: 25.89 KG/M2

## 2022-01-17 ENCOUNTER — OFFICE VISIT (OUTPATIENT)
Dept: FAMILY MEDICINE CLINIC | Age: 52
End: 2022-01-17

## 2022-01-17 VITALS
OXYGEN SATURATION: 98 % | SYSTOLIC BLOOD PRESSURE: 132 MMHG | HEIGHT: 69 IN | TEMPERATURE: 98.2 F | DIASTOLIC BLOOD PRESSURE: 77 MMHG | WEIGHT: 168 LBS | BODY MASS INDEX: 24.88 KG/M2 | HEART RATE: 57 BPM

## 2022-01-17 DIAGNOSIS — R03.0 ELEVATED BLOOD PRESSURE READING: ICD-10-CM

## 2022-01-17 DIAGNOSIS — J06.9 UPPER RESPIRATORY INFECTION WITH COUGH AND CONGESTION: Primary | ICD-10-CM

## 2022-01-17 DIAGNOSIS — F17.210 CIGARETTE NICOTINE DEPENDENCE WITHOUT COMPLICATION: ICD-10-CM

## 2022-01-17 PROBLEM — N20.0 KIDNEY STONES: Status: RESOLVED | Noted: 2022-01-17 | Resolved: 2022-01-17

## 2022-01-17 PROBLEM — N20.0 KIDNEY STONES: Status: ACTIVE | Noted: 2022-01-17

## 2022-01-17 LAB
EXPIRATION DATE: NORMAL
EXPIRATION DATE: NORMAL
FLUAV AG NPH QL: NEGATIVE
FLUBV AG NPH QL: NEGATIVE
INTERNAL CONTROL: NORMAL
INTERNAL CONTROL: NORMAL
Lab: NORMAL
Lab: NORMAL
SARS-COV-2 AG UPPER RESP QL IA.RAPID: NOT DETECTED

## 2022-01-17 PROCEDURE — 99213 OFFICE O/P EST LOW 20 MIN: CPT | Performed by: NURSE PRACTITIONER

## 2022-01-17 PROCEDURE — 87426 SARSCOV CORONAVIRUS AG IA: CPT | Performed by: NURSE PRACTITIONER

## 2022-01-17 PROCEDURE — 87804 INFLUENZA ASSAY W/OPTIC: CPT | Performed by: NURSE PRACTITIONER

## 2022-01-17 PROCEDURE — U0004 COV-19 TEST NON-CDC HGH THRU: HCPCS | Performed by: NURSE PRACTITIONER

## 2022-01-17 RX ORDER — PREDNISONE 10 MG/1
30 TABLET ORAL DAILY
Qty: 15 TABLET | Refills: 0 | Status: SHIPPED | OUTPATIENT
Start: 2022-01-17 | End: 2022-01-22

## 2022-01-17 NOTE — PROGRESS NOTES
"Chief Complaint  Maldonado Hammer presents to [facility] for URI    Subjective          History of Present Illness    Rudolph is here today with c/o symptoms including body aches, chills, cough, headache. This started one week ago. Has been taking Dayquil and Nyquil and Mucinex. Has not had covid or influenza vaccine. Has been around ill appearing people at work but does not know their diagnosis.     Review of Systems      Allergies   Allergen Reactions   • Penicillins Rash      History reviewed. No pertinent past medical history.  Current Outpatient Medications   Medication Sig Dispense Refill   • Ibuprofen (ADVIL PO) Take  by mouth As Needed.     • Pseudoephedrine-APAP-DM (DAYQUIL MULTI-SYMPTOM COLD/FLU PO) Take  by mouth.     • predniSONE (DELTASONE) 10 MG tablet Take 3 tablets by mouth Daily for 5 days. 15 tablet 0     No current facility-administered medications for this visit.     Past Surgical History:   Procedure Laterality Date   • VASECTOMY        Social History     Tobacco Use   • Smoking status: Current Every Day Smoker     Packs/day: 1.00     Years: 25.00     Pack years: 25.00   • Smokeless tobacco: Never Used   Vaping Use   • Vaping Use: Never used   Substance Use Topics   • Alcohol use: Yes   • Drug use: Defer     Family History   Problem Relation Age of Onset   • Cancer Mother         colon   • Hypertension Mother      Health Maintenance Due   Topic Date Due   • COLORECTAL CANCER SCREENING  Never done   • ANNUAL PHYSICAL  Never done   • HEPATITIS C SCREENING  Never done   • LUNG CANCER SCREENING  Never done      There is no immunization history for the selected administration types on file for this patient.     Objective     Vitals:    01/17/22 1446 01/17/22 1531   BP: 151/79 132/77   Pulse: 65 57   Temp: 98.2 °F (36.8 °C)    SpO2: 98%    Weight: 76.2 kg (168 lb)    Height: 175.3 cm (69\")      Body mass index is 24.81 kg/m².     Physical Exam  Vitals reviewed. "   Constitutional:       General: He is not in acute distress.     Appearance: Normal appearance. He is well-developed.   HENT:      Head: Normocephalic and atraumatic.      Right Ear: Tympanic membrane and ear canal normal.      Left Ear: Tympanic membrane and ear canal normal.      Nose: Nose normal.      Mouth/Throat:      Mouth: Mucous membranes are moist.   Eyes:      Extraocular Movements: Extraocular movements intact.      Pupils: Pupils are equal, round, and reactive to light.   Cardiovascular:      Rate and Rhythm: Normal rate and regular rhythm.   Pulmonary:      Effort: Pulmonary effort is normal.      Breath sounds: Normal breath sounds.   Neurological:      Mental Status: He is alert and oriented to person, place, and time.   Psychiatric:         Mood and Affect: Mood and affect normal.           Result Review :     The following data was reviewed by: JIHAN Arambula on 01/17/2022:          POCT SARS-CoV-2 Antigen HAYDEN (01/17/2022 15:00)  POCT Influenza A/B (01/17/2022 16:10)                    Assessment and Plan      Diagnoses and all orders for this visit:    1. Upper respiratory infection with cough and congestion (Primary)  Comments:  Rest, fluids, off work until symptoms improved and PCR test negative.   Orders:  -     POCT SARS-CoV-2 Antigen HAYDEN  -     predniSONE (DELTASONE) 10 MG tablet; Take 3 tablets by mouth Daily for 5 days.  Dispense: 15 tablet; Refill: 0  -     POCT Influenza A/B  -     COVID-19,APTIMA PANTHER(RALPH),[facility], NP/OP SWAB IN UTM/VTM/SALINE TRANSPORT MEDIA,24 HR TAT - Swab, Nasopharynx; Future  -     COVID-19,APTIMA PANTHER(RALPH),[facility], NP/OP SWAB IN UTM/VTM/SALINE TRANSPORT MEDIA,24 HR TAT - Swab, Nasopharynx    2. Elevated blood pressure reading  Comments:  May be elevated due to illness. Will have him return for physical and BP recheck.     3. Cigarette nicotine dependence without complication    Patient has been educated on the risk of diseases from using  tobacco products such as cancer, COPD, and heart disease and has been advised to quit. I spent less than 3 minutes counseling the patient.             Follow Up     Return for As needed for persistent or worsening symptoms.

## 2022-01-18 LAB — SARS-COV-2 RNA PNL SPEC NAA+PROBE: DETECTED

## 2022-02-17 ENCOUNTER — OFFICE VISIT (OUTPATIENT)
Dept: FAMILY MEDICINE CLINIC | Age: 52
End: 2022-02-17

## 2022-02-17 VITALS
HEART RATE: 61 BPM | WEIGHT: 172 LBS | SYSTOLIC BLOOD PRESSURE: 121 MMHG | BODY MASS INDEX: 25.48 KG/M2 | TEMPERATURE: 98.3 F | DIASTOLIC BLOOD PRESSURE: 68 MMHG | OXYGEN SATURATION: 97 % | HEIGHT: 69 IN

## 2022-02-17 DIAGNOSIS — Z00.00 ANNUAL PHYSICAL EXAM: Primary | ICD-10-CM

## 2022-02-17 DIAGNOSIS — Z11.59 SCREENING FOR VIRAL DISEASE: ICD-10-CM

## 2022-02-17 DIAGNOSIS — F17.210 CIGARETTE NICOTINE DEPENDENCE WITHOUT COMPLICATION: ICD-10-CM

## 2022-02-17 DIAGNOSIS — Z12.5 SCREENING FOR MALIGNANT NEOPLASM OF PROSTATE: ICD-10-CM

## 2022-02-17 PROCEDURE — 99396 PREV VISIT EST AGE 40-64: CPT | Performed by: NURSE PRACTITIONER

## 2022-02-17 NOTE — PROGRESS NOTES
Chief Complaint  Maldonado Hammer presents to [facility] for Annual Exam    Subjective          History of Present Illness    Rudolph is here today for annual preventive exam.   Declines zoster, PNA, influenza, Tdap, covid vaccines.  Declines screening colonoscopy, cologuard, lung CA screening.   Has not had PSA but agreeable.     Review of Systems   Constitutional: Negative for chills and fever.   HENT: Negative for ear pain and sore throat.    Eyes: Negative for blurred vision and redness.   Respiratory: Negative for cough, shortness of breath and wheezing.    Cardiovascular: Negative for chest pain and palpitations.   Gastrointestinal: Negative for abdominal pain, constipation, diarrhea, nausea and vomiting.   Genitourinary: Negative for dysuria, frequency and urgency.   Musculoskeletal: Negative for back pain and joint swelling.   Skin: Negative for rash.   Neurological: Negative for dizziness and headache.   Psychiatric/Behavioral: Negative for suicidal ideas and depressed mood.         Allergies   Allergen Reactions   • Penicillins Rash      History reviewed. No pertinent past medical history.  Current Outpatient Medications   Medication Sig Dispense Refill   • Ibuprofen (ADVIL PO) Take  by mouth As Needed.       No current facility-administered medications for this visit.     Past Surgical History:   Procedure Laterality Date   • VASECTOMY        Social History     Tobacco Use   • Smoking status: Current Every Day Smoker     Packs/day: 1.00     Years: 25.00     Pack years: 25.00   • Smokeless tobacco: Never Used   Vaping Use   • Vaping Use: Never used   Substance Use Topics   • Alcohol use: Yes   • Drug use: Defer     Family History   Problem Relation Age of Onset   • Cancer Mother         colon   • Hypertension Mother      Health Maintenance Due   Topic Date Due   • COLORECTAL CANCER SCREENING  Never done   • ANNUAL PHYSICAL  Never done   • HEPATITIS C SCREENING  Never  "done      There is no immunization history for the selected administration types on file for this patient.     Objective     Vitals:    02/17/22 1621   BP: 121/68   Pulse: 61   Temp: 98.3 °F (36.8 °C)   SpO2: 97%   Weight: 78 kg (172 lb)   Height: 175.3 cm (69\")     Body mass index is 25.4 kg/m².     Physical Exam  Vitals reviewed.   Constitutional:       General: He is not in acute distress.     Appearance: Normal appearance.   HENT:      Head: Normocephalic and atraumatic.      Right Ear: Hearing, tympanic membrane and ear canal normal.      Left Ear: Hearing, tympanic membrane and ear canal normal.      Mouth/Throat:      Mouth: Mucous membranes are moist.   Eyes:      Extraocular Movements: Extraocular movements intact.      Pupils: Pupils are equal, round, and reactive to light.   Cardiovascular:      Rate and Rhythm: Normal rate and regular rhythm.   Pulmonary:      Effort: Pulmonary effort is normal. No respiratory distress.      Breath sounds: Normal breath sounds.   Abdominal:      General: Bowel sounds are normal.      Palpations: Abdomen is soft.      Tenderness: There is no abdominal tenderness.   Musculoskeletal:         General: Normal range of motion.      Cervical back: Normal range of motion and neck supple.   Skin:     General: Skin is warm and dry.   Neurological:      Mental Status: He is alert and oriented to person, place, and time.   Psychiatric:         Mood and Affect: Mood normal.           Result Review :                               Assessment and Plan      Diagnoses and all orders for this visit:    1. Annual physical exam (Primary)  Comments:  Counseled health maintenance recommendations. BP much improved.   Orders:  -     CBC & Differential; Future  -     Comprehensive Metabolic Panel; Future  -     Lipid Panel; Future    2. Screening for malignant neoplasm of prostate  -     PSA SCREENING; Future    3. Screening for viral disease  -     Hepatitis C antibody; Future    4. Cigarette " nicotine dependence without complication    Patient has been educated on the risk of diseases from using tobacco products such as cancer, COPD, and heart disease and has been advised to quit. I spent less than 3 minutes counseling the patient.          Follow Up     Return in about 1 year (around 2/17/2023) for Annual physical.

## 2022-05-19 ENCOUNTER — OFFICE VISIT (OUTPATIENT)
Dept: FAMILY MEDICINE CLINIC | Age: 52
End: 2022-05-19

## 2022-05-19 VITALS
DIASTOLIC BLOOD PRESSURE: 74 MMHG | WEIGHT: 168.2 LBS | SYSTOLIC BLOOD PRESSURE: 140 MMHG | HEART RATE: 82 BPM | OXYGEN SATURATION: 96 % | TEMPERATURE: 98.1 F | BODY MASS INDEX: 24.91 KG/M2 | HEIGHT: 69 IN

## 2022-05-19 DIAGNOSIS — J20.9 ACUTE BRONCHITIS, UNSPECIFIED ORGANISM: Primary | ICD-10-CM

## 2022-05-19 LAB
EXPIRATION DATE: NORMAL
FLUAV AG UPPER RESP QL IA.RAPID: NOT DETECTED
FLUBV AG UPPER RESP QL IA.RAPID: NOT DETECTED
INTERNAL CONTROL: NORMAL
Lab: NORMAL
SARS-COV-2 AG UPPER RESP QL IA.RAPID: NOT DETECTED

## 2022-05-19 PROCEDURE — 99213 OFFICE O/P EST LOW 20 MIN: CPT | Performed by: NURSE PRACTITIONER

## 2022-05-19 PROCEDURE — 87428 SARSCOV & INF VIR A&B AG IA: CPT | Performed by: NURSE PRACTITIONER

## 2022-05-19 RX ORDER — BROMPHENIRAMINE MALEATE, PSEUDOEPHEDRINE HYDROCHLORIDE, AND DEXTROMETHORPHAN HYDROBROMIDE 2; 30; 10 MG/5ML; MG/5ML; MG/5ML
2.5 SYRUP ORAL 4 TIMES DAILY PRN
Qty: 473 ML | Refills: 0 | Status: SHIPPED | OUTPATIENT
Start: 2022-05-19 | End: 2022-10-24

## 2022-05-19 RX ORDER — METHYLPREDNISOLONE 4 MG/1
TABLET ORAL DAILY
Qty: 21 TABLET | Refills: 0 | Status: SHIPPED | OUTPATIENT
Start: 2022-05-19 | End: 2022-10-24

## 2022-05-19 RX ORDER — DOXYCYCLINE HYCLATE 100 MG/1
100 CAPSULE ORAL 2 TIMES DAILY
Qty: 20 CAPSULE | Refills: 0 | Status: SHIPPED | OUTPATIENT
Start: 2022-05-19 | End: 2022-10-24

## 2022-05-19 NOTE — PROGRESS NOTES
Chief Complaint  Maldonado Hammer presents to [facility] for Cough (headache)    Subjective          History of Present Illness  Here for 4 day hx of green thick productive cough, slight low grade fever, no sinus pressure or headache, slight sore throat , no n/v/d/c taking OTC Nyquil and Mucinex but seems to be getting worse , is a smoker , not exposed to anyone with Covid or the Flu that he is aware of .     Review of Systems   Constitutional: Positive for fever. Negative for fatigue.   HENT: Positive for ear pain, sinus pressure and sore throat. Negative for congestion.    Eyes: Negative.  Negative for pain.   Respiratory: Positive for cough. Negative for shortness of breath and wheezing.    Cardiovascular: Negative for chest pain, palpitations and leg swelling.   Gastrointestinal: Negative.  Negative for abdominal pain, constipation, diarrhea, nausea and vomiting.   Genitourinary: Negative for decreased urine volume, dysuria, frequency and nocturia.   Musculoskeletal: Negative for gait problem.   Skin: Negative.    Neurological: Negative for tremors, seizures, weakness, headache, memory problem and confusion.   Psychiatric/Behavioral: Negative.  Negative for self-injury and suicidal ideas.         Allergies   Allergen Reactions   • Penicillins Rash      No past medical history on file.  Current Outpatient Medications   Medication Sig Dispense Refill   • Ibuprofen (ADVIL PO) Take  by mouth As Needed.     • brompheniramine-pseudoephedrine-DM 30-2-10 MG/5ML syrup Take 2.5 mL by mouth 4 (Four) Times a Day As Needed for Congestion, Cough or Allergies. 473 mL 0   • doxycycline (VIBRAMYCIN) 100 MG capsule Take 1 capsule by mouth 2 (Two) Times a Day. 20 capsule 0   • methylPREDNISolone (MEDROL) 4 MG dose pack Take 21 tablets by mouth Daily. Take as directed on package instructions. 21 tablet 0     No current facility-administered medications for this visit.     Past Surgical History:  "  Procedure Laterality Date   • VASECTOMY        Social History     Tobacco Use   • Smoking status: Current Every Day Smoker     Packs/day: 1.00     Years: 25.00     Pack years: 25.00   • Smokeless tobacco: Never Used   Vaping Use   • Vaping Use: Never used   Substance Use Topics   • Alcohol use: Yes   • Drug use: Defer     Family History   Problem Relation Age of Onset   • Cancer Mother         colon   • Hypertension Mother      Health Maintenance Due   Topic Date Due   • COLORECTAL CANCER SCREENING  Never done   • COVID-19 Vaccine (1) Never done   • HEPATITIS C SCREENING  Never done   • ZOSTER VACCINE (1 of 2) Never done      There is no immunization history for the selected administration types on file for this patient.     Objective     Vitals:    05/19/22 1621   BP: 140/74   BP Location: Left arm   Patient Position: Sitting   Pulse: 82   Temp: 98.1 °F (36.7 °C)   TempSrc: Oral   SpO2: 96%   Weight: 76.3 kg (168 lb 3.2 oz)   Height: 175.3 cm (69\")     Body mass index is 24.84 kg/m².     Physical Exam  Constitutional:       Appearance: Normal appearance.   HENT:      Right Ear: Tympanic membrane normal.      Left Ear: Tympanic membrane normal.      Nose: Nose normal.      Mouth/Throat:      Pharynx: No oropharyngeal exudate or posterior oropharyngeal erythema.   Eyes:      Pupils: Pupils are equal, round, and reactive to light.   Neck:      Vascular: No carotid bruit.   Cardiovascular:      Rate and Rhythm: Normal rate and regular rhythm.      Heart sounds: Normal heart sounds.   Pulmonary:      Effort: Pulmonary effort is normal.      Breath sounds: Normal breath sounds.      Comments: Coarse yanci upper and lower fields   Musculoskeletal:         General: Normal range of motion.   Skin:     General: Skin is warm and dry.   Neurological:      General: No focal deficit present.      Mental Status: He is alert.   Psychiatric:         Mood and Affect: Mood normal.         Behavior: Behavior normal.           Result " Review :    Office Visit on 05/19/2022   Component Date Value Ref Range Status   • SARS Antigen 05/19/2022 Not Detected  Not Detected, Presumptive Negative Final   • Influenza A Antigen HAYDEN 05/19/2022 Not Detected  Not Detected Final   • Influenza B Antigen HAYDEN 05/19/2022 Not Detected  Not Detected Final   • Internal Control 05/19/2022 Passed  Passed Final   • Lot Number 05/19/2022 [id number]   Final   • Expiration Date 05/19/2022 12-8-2023   Final   Office Visit on 01/17/2022   Component Date Value Ref Range Status   • SARS Antigen 01/17/2022 Not Detected  Not Detected Final   • Internal Control 01/17/2022 Passed  Passed Final   • Lot Number 01/17/2022 [id number]   Final   • Expiration Date 01/17/2022 03/6/23   Final   • Rapid Influenza A Ag 01/17/2022 Negative  Negative Final   • Rapid Influenza B Ag 01/17/2022 Negative  Negative Final   • Internal Control 01/17/2022 Passed  Passed Final   • Lot Number 01/17/2022 [id number]   Final   • Expiration Date 01/17/2022 4/27/22   Final   • COVID19 01/17/2022 Detected (A) Not Detected - Ref. Range Final                        Assessment and Plan      Diagnoses and all orders for this visit:    1. Acute bronchitis, unspecified organism (Primary)  -     POCT SARS-CoV-2 Antigen HAYDEN + Flu  -     methylPREDNISolone (MEDROL) 4 MG dose pack; Take 21 tablets by mouth Daily. Take as directed on package instructions.  Dispense: 21 tablet; Refill: 0  -     doxycycline (VIBRAMYCIN) 100 MG capsule; Take 1 capsule by mouth 2 (Two) Times a Day.  Dispense: 20 capsule; Refill: 0  -     brompheniramine-pseudoephedrine-DM 30-2-10 MG/5ML syrup; Take 2.5 mL by mouth 4 (Four) Times a Day As Needed for Congestion, Cough or Allergies.  Dispense: 473 mL; Refill: 0            Follow Up     No follow-ups on file.

## 2022-05-20 ENCOUNTER — TELEPHONE (OUTPATIENT)
Dept: FAMILY MEDICINE CLINIC | Age: 52
End: 2022-05-20

## 2022-05-20 DIAGNOSIS — J20.9 ACUTE BRONCHITIS, UNSPECIFIED ORGANISM: Primary | ICD-10-CM

## 2022-05-20 RX ORDER — ALBUTEROL SULFATE 90 UG/1
2 AEROSOL, METERED RESPIRATORY (INHALATION) EVERY 6 HOURS PRN
Qty: 6.7 G | Refills: 1 | Status: SHIPPED | OUTPATIENT
Start: 2022-05-20 | End: 2022-10-24

## 2022-05-20 NOTE — TELEPHONE ENCOUNTER
Caller: Maldonado Hammer    Relationship: Self    Best call back number: [phone]     What medication are you requesting: INHALER    What are your current symptoms: BRONCHITIS    If a prescription is needed, what is your preferred pharmacy and phone number: [facility] [place]     Additional notes:PATIENT STATES HE THOUGHT JANES MARTINEZ WAS ALSO SENDING IN AN INHALER FROM APPOINTMENT ON 05/19 - PATIENT REQUESTS THAT THIS BE SENT IN TODAY;     PATIENT REQUESTS CALLBACK OR TEXT FOR STATUS OF REQUEST.

## 2022-10-24 ENCOUNTER — OFFICE VISIT (OUTPATIENT)
Dept: FAMILY MEDICINE CLINIC | Age: 52
End: 2022-10-24

## 2022-10-24 ENCOUNTER — HOSPITAL ENCOUNTER (OUTPATIENT)
Dept: GENERAL RADIOLOGY | Facility: HOSPITAL | Age: 52
Discharge: HOME | End: 2022-10-24
Admitting: NURSE PRACTITIONER

## 2022-10-24 VITALS
WEIGHT: 170.5 LBS | HEART RATE: 64 BPM | HEIGHT: 69 IN | OXYGEN SATURATION: 99 % | SYSTOLIC BLOOD PRESSURE: 139 MMHG | TEMPERATURE: 98.5 F | DIASTOLIC BLOOD PRESSURE: 86 MMHG | BODY MASS INDEX: 25.25 KG/M2

## 2022-10-24 DIAGNOSIS — M25.521 RIGHT ELBOW PAIN: ICD-10-CM

## 2022-10-24 DIAGNOSIS — M25.532 LEFT WRIST PAIN: Primary | ICD-10-CM

## 2022-10-24 DIAGNOSIS — M25.532 LEFT WRIST PAIN: ICD-10-CM

## 2022-10-24 PROCEDURE — 73070 X-RAY EXAM OF ELBOW: CPT

## 2022-10-24 PROCEDURE — 99213 OFFICE O/P EST LOW 20 MIN: CPT | Performed by: NURSE PRACTITIONER

## 2022-10-24 PROCEDURE — 73110 X-RAY EXAM OF WRIST: CPT

## 2022-10-24 NOTE — PROGRESS NOTES
"Chief Complaint  Numbness ((In left hand, right arm/wrist) sx started 4 weeks, he has been wearing braces at night and can't pick anything up ) and Pain (Pt stated it feels like \"someone is smashing his hand\" )    Subjective        Maldonado Hammer presents to [facility]  Hand Pain   The incident occurred more than 1 week ago. The injury mechanism was repetitive motion. The pain is present in the left hand. The quality of the pain is described as aching. The pain does not radiate. The pain is at a severity of 10/10. The pain is severe. The pain has been worsening since the incident. Associated symptoms include numbness and tingling. The symptoms are aggravated by movement. He has tried NSAIDs, ice and heat for the symptoms. The treatment provided no relief.       Objective   Vital Signs:  /86 (BP Location: Left arm, Patient Position: Sitting, Cuff Size: Adult)   Pulse 64   Temp 98.5 °F (36.9 °C) (Oral)   Ht 175.3 cm (69\")   Wt 77.3 kg (170 lb 8 oz)   SpO2 99% Comment: room air  BMI 25.18 kg/m²   Estimated body mass index is 25.18 kg/m² as calculated from the following:    Height as of this encounter: 175.3 cm (69\").    Weight as of this encounter: 77.3 kg (170 lb 8 oz).          Physical Exam  HENT:      Head: Normocephalic.   Cardiovascular:      Rate and Rhythm: Normal rate.   Pulmonary:      Effort: Pulmonary effort is normal.   Musculoskeletal:      Right elbow: Swelling present. Decreased range of motion. Tenderness present in lateral epicondyle.      Left hand: Swelling and tenderness present. Normal capillary refill. Normal pulse.   Skin:     General: Skin is warm and dry.   Neurological:      Mental Status: He is alert and oriented to person, place, and time.   Psychiatric:         Mood and Affect: Mood normal.        Result Review :                Assessment and Plan   Diagnoses and all orders for this visit:    1. Left wrist pain (Primary)  -     XR Wrist " 3+ View Left; Future  -     diclofenac (VOLTAREN) 50 MG EC tablet; Take 1 tablet by mouth 2 (Two) Times a Day As Needed (Left wrist, right elbow pain).  Dispense: 40 tablet; Refill: 0    2. Right elbow pain  -     XR Elbow 2 View Right (In Office)  -     diclofenac (VOLTAREN) 50 MG EC tablet; Take 1 tablet by mouth 2 (Two) Times a Day As Needed (Left wrist, right elbow pain).  Dispense: 40 tablet; Refill: 0             Follow Up   Return if symptoms worsen or fail to improve.  Patient was given instructions and counseling regarding his condition or for health maintenance advice. Please see specific information pulled into the AVS if appropriate.

## 2022-10-25 NOTE — PROGRESS NOTES
Left wrist xray normal. Ask patient if he wants to come in for carpel tunnel injection. If so, please place him in a 30 minute appointment slot.

## 2022-10-25 NOTE — PROGRESS NOTES
Right elbow xray shows mild inflammation of the tendon that connects the tricep to the bone. Continue antiinflammatories. Follow up if pain continues for possible further imaging.

## 2022-10-26 ENCOUNTER — OFFICE VISIT (OUTPATIENT)
Dept: FAMILY MEDICINE CLINIC | Age: 52
End: 2022-10-26

## 2022-10-26 VITALS
HEART RATE: 67 BPM | BODY MASS INDEX: 25.64 KG/M2 | OXYGEN SATURATION: 96 % | DIASTOLIC BLOOD PRESSURE: 81 MMHG | WEIGHT: 173.13 LBS | SYSTOLIC BLOOD PRESSURE: 142 MMHG | HEIGHT: 69 IN

## 2022-10-26 DIAGNOSIS — M25.532 LEFT WRIST PAIN: Primary | ICD-10-CM

## 2022-10-26 PROCEDURE — 20605 DRAIN/INJ JOINT/BURSA W/O US: CPT | Performed by: NURSE PRACTITIONER

## 2022-10-26 RX ORDER — TRIAMCINOLONE ACETONIDE 40 MG/ML
40 INJECTION, SUSPENSION INTRA-ARTICULAR; INTRAMUSCULAR
Status: COMPLETED | OUTPATIENT
Start: 2022-10-26 | End: 2022-10-26

## 2022-10-26 RX ORDER — LIDOCAINE HYDROCHLORIDE 10 MG/ML
2 INJECTION, SOLUTION INFILTRATION; PERINEURAL
Status: COMPLETED | OUTPATIENT
Start: 2022-10-26 | End: 2022-10-26

## 2022-10-26 RX ADMIN — LIDOCAINE HYDROCHLORIDE 2 ML: 10 INJECTION, SOLUTION INFILTRATION; PERINEURAL at 15:59

## 2022-10-26 RX ADMIN — TRIAMCINOLONE ACETONIDE 40 MG: 40 INJECTION, SUSPENSION INTRA-ARTICULAR; INTRAMUSCULAR at 15:59

## 2022-10-26 NOTE — PROGRESS NOTES
"Chief Complaint  Carpal Tunnel (Left wrist Wanting injection)    Subjective        Maldonado Hammer presents to [facility]  Wrist Pain   The pain is present in the left wrist. This is a recurrent problem. The current episode started more than 1 month ago. The problem occurs intermittently. The problem has been gradually worsening. The quality of the pain is described as aching. Associated symptoms include joint swelling, a limited range of motion, numbness, stiffness and tingling. The symptoms are aggravated by activity and lying down. He has tried acetaminophen, NSAIDS and rest for the symptoms. The treatment provided mild relief. His past medical history is significant for osteoarthritis.       Objective   Vital Signs:  /81 (BP Location: Left arm, Patient Position: Sitting, Cuff Size: Adult)   Pulse 67   Ht 175.3 cm (69.02\")   Wt 78.5 kg (173 lb 2 oz)   SpO2 96%   BMI 25.55 kg/m²   Estimated body mass index is 25.55 kg/m² as calculated from the following:    Height as of this encounter: 175.3 cm (69.02\").    Weight as of this encounter: 78.5 kg (173 lb 2 oz).          Physical Exam  Cardiovascular:      Rate and Rhythm: Normal rate.   Pulmonary:      Effort: Pulmonary effort is normal.   Musculoskeletal:      Left wrist: Swelling present. No crepitus. Decreased range of motion. Normal pulse.   Skin:     General: Skin is warm and dry.   Neurological:      Mental Status: He is alert and oriented to person, place, and time.   Psychiatric:         Mood and Affect: Mood normal.        Result Review :         Joint Aspiration/Injection    Date/Time: 10/26/2022 3:59 PM  Performed by: Melanie Roque APRN  Authorized by: Melanie Roque APRN   Indications: pain and joint swelling   Body area: wrist  Joint: left wrist  Local anesthesia used: no    Anesthesia:  Local anesthesia used: no    Sedation:  Patient sedated: no    Preparation: Patient was prepped and draped in the " usual sterile fashion.  Needle size: 22 G  Ultrasound guidance: no  Meds administered: 2 mL lidocaine 1 %; 40 mg triamcinolone acetonide 40 MG/ML  Patient tolerance: patient tolerated the procedure well with no immediate complications  Comments: Discussed procedure, alternatives, possible complications and answered all questions. Written consent was obtained. Patient was in sitting position. Left carpel tunnel injection performed. No complications. Patient ambulatory at discharge.               Assessment and Plan   Diagnoses and all orders for this visit:    1. Left wrist pain (Primary)  -     Joint Aspiration/Injection  -     lidocaine (XYLOCAINE) 1 % injection 2 mL  -     triamcinolone acetonide (KENALOG-40) injection 40 mg             Follow Up   Return if symptoms worsen or fail to improve.  Patient was given instructions and counseling regarding his condition or for health maintenance advice. Please see specific information pulled into the AVS if appropriate.

## 2023-01-27 ENCOUNTER — OFFICE VISIT (OUTPATIENT)
Dept: FAMILY MEDICINE CLINIC | Age: 53
End: 2023-01-27
Payer: COMMERCIAL

## 2023-01-27 VITALS
BODY MASS INDEX: 24.62 KG/M2 | HEART RATE: 73 BPM | WEIGHT: 166.2 LBS | HEIGHT: 69 IN | TEMPERATURE: 98.8 F | SYSTOLIC BLOOD PRESSURE: 135 MMHG | DIASTOLIC BLOOD PRESSURE: 85 MMHG

## 2023-01-27 DIAGNOSIS — J06.9 ACUTE URI: Primary | ICD-10-CM

## 2023-01-27 PROCEDURE — 99213 OFFICE O/P EST LOW 20 MIN: CPT | Performed by: NURSE PRACTITIONER

## 2023-01-27 RX ORDER — DOXYCYCLINE HYCLATE 100 MG
100 TABLET ORAL 2 TIMES DAILY
Qty: 20 TABLET | Refills: 0 | Status: SHIPPED | OUTPATIENT
Start: 2023-01-27 | End: 2023-02-06

## 2023-01-27 RX ORDER — PREDNISONE 20 MG/1
40 TABLET ORAL DAILY
Qty: 10 TABLET | Refills: 0 | Status: SHIPPED | OUTPATIENT
Start: 2023-01-27 | End: 2023-02-20

## 2023-01-27 RX ORDER — BROMPHENIRAMINE MALEATE, PSEUDOEPHEDRINE HYDROCHLORIDE, AND DEXTROMETHORPHAN HYDROBROMIDE 2; 30; 10 MG/5ML; MG/5ML; MG/5ML
5 SYRUP ORAL 4 TIMES DAILY PRN
Qty: 118 ML | Refills: 0 | Status: SHIPPED | OUTPATIENT
Start: 2023-01-27 | End: 2023-02-20

## 2023-01-27 NOTE — PROGRESS NOTES
"Chief Complaint  Cough    Subjective          Maldonado Hammer presents to [facility] c/o 2.5 weeks cough, intermittent soa, subjective fever, pluertic chest pain. Wife also sick with same symptoms. Denies nausea, vomiting, diarrhea, change of taste or smell. Usually smokes 1 ppd.  Patient has used Mucinex, DayQuil and Aleve at home to help with symptoms.      Objective   Vital Signs:   Vitals:    01/27/23 0903   BP: 135/85   Pulse: 73   Temp: 98.8 °F (37.1 °C)   TempSrc: Oral   Weight: 75.4 kg (166 lb 3.2 oz)   Height: 175.3 cm (69\")       Physical Exam  Vitals reviewed.   Constitutional:       General: He is not in acute distress.     Appearance: Normal appearance. He is well-developed.   HENT:      Head: Normocephalic and atraumatic.   Eyes:      Conjunctiva/sclera: Conjunctivae normal.      Pupils: Pupils are equal, round, and reactive to light.   Cardiovascular:      Rate and Rhythm: Normal rate and regular rhythm.      Heart sounds: Normal heart sounds. No murmur heard.  Pulmonary:      Effort: Pulmonary effort is normal. No respiratory distress.      Breath sounds: Wheezing and rhonchi present.      Comments: Minimal wheezing and rhonchi and right upper lobe  Skin:     General: Skin is warm and dry.   Neurological:      Mental Status: He is alert and oriented to person, place, and time.   Psychiatric:         Mood and Affect: Mood and affect normal.         Behavior: Behavior normal.         Thought Content: Thought content normal.         Judgment: Judgment normal.          Result Review :              Assessment and Plan    Diagnoses and all orders for this visit:    1. Acute URI (Primary)  -     doxycycline (VIBRAMYICN) 100 MG tablet; Take 1 tablet by mouth 2 (Two) Times a Day for 10 days.  Dispense: 20 tablet; Refill: 0  -     predniSONE (DELTASONE) 20 MG tablet; Take 2 tablets by mouth Daily.  Dispense: 10 tablet; Refill: 0  -     brompheniramine-pseudoephedrine-DM " 30-2-10 MG/5ML syrup; Take 5 mL by mouth 4 (Four) Times a Day As Needed for Congestion or Cough.  Dispense: 118 mL; Refill: 0    Increase fluid intake and rest.  Tylenol/ibuprofen for discomfort/fever.  Complete prescribed antibiotic and steroid.  Potential side effects of medication discussed.  Notify office with any acute concerns or issues.  Follow-up as needed.  Patient to go to ED with any chest pain or shortness of air. Patient verbalizes understanding, agrees with plan of care and has no further questions upon discharge.    Please note that portions of this note were completed with a voice recognition program.    Follow Up    Return if symptoms worsen or fail to improve.  Patient was given instructions and counseling regarding his condition or for health maintenance advice. Please see specific information pulled into the AVS if appropriate.

## 2023-02-20 ENCOUNTER — OFFICE VISIT (OUTPATIENT)
Dept: FAMILY MEDICINE CLINIC | Age: 53
End: 2023-02-20
Payer: COMMERCIAL

## 2023-02-20 ENCOUNTER — LAB (OUTPATIENT)
Dept: LAB | Facility: HOSPITAL | Age: 53
End: 2023-02-20
Payer: COMMERCIAL

## 2023-02-20 VITALS
HEIGHT: 69 IN | WEIGHT: 171 LBS | BODY MASS INDEX: 25.33 KG/M2 | HEART RATE: 109 BPM | TEMPERATURE: 98 F | DIASTOLIC BLOOD PRESSURE: 87 MMHG | SYSTOLIC BLOOD PRESSURE: 131 MMHG

## 2023-02-20 DIAGNOSIS — M77.8 RIGHT ELBOW TENDONITIS: ICD-10-CM

## 2023-02-20 DIAGNOSIS — J40 BRONCHITIS: ICD-10-CM

## 2023-02-20 DIAGNOSIS — Z00.00 ANNUAL PHYSICAL EXAM: Primary | ICD-10-CM

## 2023-02-20 DIAGNOSIS — Z00.00 ANNUAL PHYSICAL EXAM: ICD-10-CM

## 2023-02-20 DIAGNOSIS — Z12.5 SCREENING FOR MALIGNANT NEOPLASM OF PROSTATE: ICD-10-CM

## 2023-02-20 DIAGNOSIS — F17.210 CIGARETTE NICOTINE DEPENDENCE WITHOUT COMPLICATION: ICD-10-CM

## 2023-02-20 DIAGNOSIS — R03.0 ELEVATED BLOOD PRESSURE READING: ICD-10-CM

## 2023-02-20 LAB
DEPRECATED RDW RBC AUTO: 41.1 FL (ref 37–54)
ERYTHROCYTE [DISTWIDTH] IN BLOOD BY AUTOMATED COUNT: 12.5 % (ref 12.3–15.4)
HCT VFR BLD AUTO: 43.9 % (ref 37.5–51)
HGB BLD-MCNC: 14.9 G/DL (ref 13–17.7)
MCH RBC QN AUTO: 30.8 PG (ref 26.6–33)
MCHC RBC AUTO-ENTMCNC: 33.9 G/DL (ref 31.5–35.7)
MCV RBC AUTO: 90.7 FL (ref 79–97)
PLATELET # BLD AUTO: 229 10*3/MM3 (ref 140–450)
PMV BLD AUTO: 11.5 FL (ref 6–12)
RBC # BLD AUTO: 4.84 10*6/MM3 (ref 4.14–5.8)
WBC NRBC COR # BLD: 8.27 10*3/MM3 (ref 3.4–10.8)

## 2023-02-20 PROCEDURE — G0103 PSA SCREENING: HCPCS

## 2023-02-20 PROCEDURE — 80061 LIPID PANEL: CPT

## 2023-02-20 PROCEDURE — 99396 PREV VISIT EST AGE 40-64: CPT | Performed by: NURSE PRACTITIONER

## 2023-02-20 PROCEDURE — 36415 COLL VENOUS BLD VENIPUNCTURE: CPT

## 2023-02-20 PROCEDURE — 80050 GENERAL HEALTH PANEL: CPT

## 2023-02-20 RX ORDER — NICOTINE 21 MG/24HR
1 PATCH, TRANSDERMAL 24 HOURS TRANSDERMAL EVERY 24 HOURS
Qty: 30 EACH | Refills: 1 | Status: SHIPPED | OUTPATIENT
Start: 2023-02-20

## 2023-02-20 RX ORDER — BENZONATATE 100 MG/1
100 CAPSULE ORAL 3 TIMES DAILY PRN
Qty: 40 CAPSULE | Refills: 0 | Status: SHIPPED | OUTPATIENT
Start: 2023-02-20

## 2023-02-20 RX ORDER — AZITHROMYCIN 250 MG/1
TABLET, FILM COATED ORAL
Qty: 6 TABLET | Refills: 0 | Status: SHIPPED | OUTPATIENT
Start: 2023-02-20

## 2023-02-20 NOTE — PROGRESS NOTES
Chief Complaint  Maldonado Hammer presents to [facility] for Annual Exam    Subjective          History of Present Illness    Rudolph is here today for annual preventive exam.   Declines zoster, PNA, influenza, Tdap, covid vaccines.  Declines screening colonoscopy, cologuard, lung CA screening.   Daily smoking of 1 ppd. Recently smoking 1/2 ppd.   Seen last month for cough and congestion. Was prescribed prednisone and doxycycline. Unable to get bromfed DM due to shortage. Also taking mucinex. Only took the prednisone for 3 days.   Right elbow pain. Had xray 10/24/22 without acute fracture or malalignment.     Review of Systems   Constitutional: Negative for chills and fever.   HENT: Positive for congestion. Negative for sore throat.    Eyes: Negative for blurred vision and redness.   Respiratory: Positive for cough. Negative for shortness of breath and wheezing.    Cardiovascular: Negative for chest pain and palpitations.   Gastrointestinal: Negative for abdominal pain, constipation, diarrhea, nausea and vomiting.   Genitourinary: Negative for dysuria, frequency and urgency.   Skin: Negative for rash.   Neurological: Negative for dizziness and headache.   Psychiatric/Behavioral: Negative for suicidal ideas and depressed mood.         Allergies   Allergen Reactions   • Penicillins Rash      History reviewed. No pertinent past medical history.  Current Outpatient Medications   Medication Sig Dispense Refill   • guaiFENesin (MUCINEX PO) Take  by mouth As Needed.     • azithromycin (Zithromax Z-Nathan) 250 MG tablet Take 2 tablets by mouth on day 1, then 1 tablet daily on days 2-5 6 tablet 0   • benzonatate (Tessalon Perles) 100 MG capsule Take 1 capsule by mouth 3 (Three) Times a Day As Needed for Cough. 40 capsule 0   • nicotine (NICODERM CQ) 21 MG/24HR patch Place 1 patch on the skin as directed by provider Daily. 30 each 1     No current facility-administered medications for this  "visit.     Past Surgical History:   Procedure Laterality Date   • VASECTOMY        Social History     Tobacco Use   • Smoking status: Every Day     Packs/day: 1.00     Years: 25.00     Pack years: 25.00     Types: Cigarettes     Passive exposure: Past   • Smokeless tobacco: Never   Vaping Use   • Vaping Use: Never used   Substance Use Topics   • Alcohol use: Yes   • Drug use: Defer     Family History   Problem Relation Age of Onset   • Cancer Mother         colon   • Hypertension Mother      Health Maintenance Due   Topic Date Due   • COLORECTAL CANCER SCREENING  Never done   • HEPATITIS C SCREENING  Never done   • LUNG CANCER SCREENING  Never done      There is no immunization history for the selected administration types on file for this patient.     Objective     Vitals:    02/20/23 1617 02/20/23 1622 02/20/23 1654   BP: 144/92 141/94 131/87   BP Location: Right arm Left arm Right arm   Patient Position: Sitting Sitting Sitting   Cuff Size:   Adult   Pulse: 73 76 109   Temp: 98 °F (36.7 °C)     TempSrc: Oral     Weight: 77.6 kg (171 lb)     Height: 175.3 cm (69\")       Body mass index is 25.25 kg/m².     Physical Exam  Vitals reviewed.   Constitutional:       General: He is not in acute distress.     Appearance: Normal appearance.   HENT:      Head: Normocephalic and atraumatic.      Right Ear: Hearing, tympanic membrane and ear canal normal.      Left Ear: Hearing, tympanic membrane and ear canal normal.      Nose: Congestion present.      Mouth/Throat:      Mouth: Mucous membranes are moist.   Eyes:      Extraocular Movements: Extraocular movements intact.      Pupils: Pupils are equal, round, and reactive to light.   Cardiovascular:      Rate and Rhythm: Normal rate and regular rhythm.   Pulmonary:      Effort: Pulmonary effort is normal. No respiratory distress.      Breath sounds: Normal breath sounds.   Abdominal:      General: Bowel sounds are normal.      Palpations: Abdomen is soft.      Tenderness: " There is no abdominal tenderness.   Musculoskeletal:         General: Normal range of motion.      Cervical back: Normal range of motion and neck supple.   Skin:     General: Skin is warm and dry.   Neurological:      Mental Status: He is alert and oriented to person, place, and time.   Psychiatric:         Mood and Affect: Mood normal.           Result Review :                               Assessment and Plan      Diagnoses and all orders for this visit:    1. Annual physical exam (Primary)  Comments:  Appropriate screenings and vaccinations were reviewed with the pt and offered as indicated.  Pt counseled on healthy lifestyle including healthy diet, exercise.  Orders:  -     CBC No Differential; Future  -     Comprehensive metabolic panel; Future  -     Lipid panel; Future  -     TSH; Future    2. Cigarette nicotine dependence without complication  Assessment & Plan:  Tobacco use is unchanged.  Smoking cessation counseling was provided.  Tobacco use will be reassessed at the next regular appointment.    Orders:  -     nicotine (NICODERM CQ) 21 MG/24HR patch; Place 1 patch on the skin as directed by provider Daily.  Dispense: 30 each; Refill: 1    3. Screening for malignant neoplasm of prostate  -     PSA SCREENING; Future    4. Bronchitis  Comments:  Take prednisone 20 mg daily only.   Orders:  -     benzonatate (Tessalon Perles) 100 MG capsule; Take 1 capsule by mouth 3 (Three) Times a Day As Needed for Cough.  Dispense: 40 capsule; Refill: 0  -     azithromycin (Zithromax Z-Nathan) 250 MG tablet; Take 2 tablets by mouth on day 1, then 1 tablet daily on days 2-5  Dispense: 6 tablet; Refill: 0    5. Right elbow tendonitis  Comments:  Brace. Prednisone may also help.     6. Elevated blood pressure reading  Comments:  BP log. He will also come in to allergy room for BP recheck             Follow Up     Return in about 1 year (around 2/20/2024) for Annual physical.

## 2023-02-21 LAB
ALBUMIN SERPL-MCNC: 4.6 G/DL (ref 3.5–5.2)
ALBUMIN/GLOB SERPL: 2 G/DL
ALP SERPL-CCNC: 83 U/L (ref 39–117)
ALT SERPL W P-5'-P-CCNC: 15 U/L (ref 1–41)
ANION GAP SERPL CALCULATED.3IONS-SCNC: 9.9 MMOL/L (ref 5–15)
AST SERPL-CCNC: 14 U/L (ref 1–40)
BILIRUB SERPL-MCNC: <0.2 MG/DL (ref 0–1.2)
BUN SERPL-MCNC: 14 MG/DL (ref 6–20)
BUN/CREAT SERPL: 13.2 (ref 7–25)
CALCIUM SPEC-SCNC: 9.4 MG/DL (ref 8.6–10.5)
CHLORIDE SERPL-SCNC: 104 MMOL/L (ref 98–107)
CHOLEST SERPL-MCNC: 216 MG/DL (ref 0–200)
CO2 SERPL-SCNC: 25.1 MMOL/L (ref 22–29)
CREAT SERPL-MCNC: 1.06 MG/DL (ref 0.76–1.27)
EGFRCR SERPLBLD CKD-EPI 2021: 85 ML/MIN/1.73
GLOBULIN UR ELPH-MCNC: 2.3 GM/DL
GLUCOSE SERPL-MCNC: 84 MG/DL (ref 65–99)
HDLC SERPL-MCNC: 53 MG/DL (ref 40–60)
LDLC SERPL CALC-MCNC: 84 MG/DL (ref 0–100)
LDLC/HDLC SERPL: 1.22 {RATIO}
POTASSIUM SERPL-SCNC: 3.9 MMOL/L (ref 3.5–5.2)
PROT SERPL-MCNC: 6.9 G/DL (ref 6–8.5)
PSA SERPL-MCNC: 1.01 NG/ML (ref 0–4)
SODIUM SERPL-SCNC: 139 MMOL/L (ref 136–145)
TRIGL SERPL-MCNC: 492 MG/DL (ref 0–150)
TSH SERPL DL<=0.05 MIU/L-ACNC: 2.43 UIU/ML (ref 0.27–4.2)
VLDLC SERPL-MCNC: 79 MG/DL (ref 5–40)

## 2023-02-22 PROBLEM — E78.1 HIGH TRIGLYCERIDES: Status: ACTIVE | Noted: 2023-02-22

## 2023-05-10 ENCOUNTER — OFFICE VISIT (OUTPATIENT)
Dept: FAMILY MEDICINE CLINIC | Age: 53
End: 2023-05-10
Payer: COMMERCIAL

## 2023-05-10 ENCOUNTER — HOSPITAL ENCOUNTER (OUTPATIENT)
Dept: GENERAL RADIOLOGY | Facility: HOSPITAL | Age: 53
Discharge: HOME | End: 2023-05-10
Payer: COMMERCIAL

## 2023-05-10 VITALS
OXYGEN SATURATION: 94 % | WEIGHT: 167 LBS | BODY MASS INDEX: 24.73 KG/M2 | DIASTOLIC BLOOD PRESSURE: 89 MMHG | HEIGHT: 69 IN | HEART RATE: 62 BPM | SYSTOLIC BLOOD PRESSURE: 134 MMHG | TEMPERATURE: 98.2 F

## 2023-05-10 DIAGNOSIS — M25.521 RIGHT ELBOW PAIN: ICD-10-CM

## 2023-05-10 DIAGNOSIS — R05.1 ACUTE COUGH: ICD-10-CM

## 2023-05-10 DIAGNOSIS — H57.12 LEFT EYE PAIN: ICD-10-CM

## 2023-05-10 DIAGNOSIS — J02.9 SORE THROAT: ICD-10-CM

## 2023-05-10 DIAGNOSIS — H02.846 SWELLING OF LEFT EYELID: ICD-10-CM

## 2023-05-10 DIAGNOSIS — J06.9 ACUTE URI: Primary | ICD-10-CM

## 2023-05-10 DIAGNOSIS — H57.89 REDNESS OF LEFT EYE: ICD-10-CM

## 2023-05-10 LAB
EXPIRATION DATE: NORMAL
EXPIRATION DATE: NORMAL
FLUAV AG UPPER RESP QL IA.RAPID: NOT DETECTED
FLUBV AG UPPER RESP QL IA.RAPID: NOT DETECTED
INTERNAL CONTROL: NORMAL
INTERNAL CONTROL: NORMAL
Lab: NORMAL
Lab: NORMAL
S PYO AG THROAT QL: NEGATIVE
SARS-COV-2 AG UPPER RESP QL IA.RAPID: NOT DETECTED

## 2023-05-10 PROCEDURE — 87081 CULTURE SCREEN ONLY: CPT

## 2023-05-10 PROCEDURE — 71046 X-RAY EXAM CHEST 2 VIEWS: CPT

## 2023-05-10 RX ORDER — BENZONATATE 100 MG/1
100 CAPSULE ORAL 3 TIMES DAILY PRN
Qty: 21 CAPSULE | Refills: 0 | Status: SHIPPED | OUTPATIENT
Start: 2023-05-10

## 2023-05-10 RX ORDER — ALBUTEROL SULFATE 90 UG/1
2 AEROSOL, METERED RESPIRATORY (INHALATION) EVERY 4 HOURS PRN
Qty: 18 G | Refills: 0 | Status: SHIPPED | OUTPATIENT
Start: 2023-05-10

## 2023-05-10 RX ORDER — DOXYCYCLINE HYCLATE 100 MG/1
100 CAPSULE ORAL 2 TIMES DAILY
Qty: 14 CAPSULE | Refills: 0 | Status: SHIPPED | OUTPATIENT
Start: 2023-05-10 | End: 2023-05-17

## 2023-05-10 NOTE — PROGRESS NOTES
Subjective     CHIEF COMPLAINT    Chief Complaint   Patient presents with   • eye redness      Left red and swollen eyelid x 2 days, cough, sore throat,  right elbow pain      History of Present Illness  Patient is a 53-year-old male who presents to the clinic today with a few complaints.  He complains of a cough which has been present for a few weeks but has been worsening recently.  It is specifically worse at night.  He denies any fever or chills.  He does think he can hear himself wheezing some.  He is a smoker.  Denies any chronic lung issues.  Reports he is feeling sore from his cough.  He also has a sore throat.  He has been taking Mucinex and DayQuil as well as a generic allergy pill.    Additionally he complains of some left eye pain, redness and swelling of his left upper eyelid.  This started yesterday.  He feels like there is something in it.  He denies any photophobia, nausea or vomiting.  He does not wear contacts.  Does have reading glasses and his normal eye doctor is Dr. Sampson.  He noted some drainage from the left eye and it was matted shut this morning.  The drainage is green/yellow.  His vision is blurry.  He has been using a hot washcloth on it.    Patient complains of right elbow pain.  This is a chronic issue for him.  He was seen in office in October for this and an x-ray was completed that showed no acute findings.  He was curious about getting a steroid shot in the elbow.  He has been wearing a brace but not taking anything over-the-counter for it.  It has not worsened since October but has not improved. He denies numbness or tingling.     Review of Systems   Constitutional: Positive for fever (subjective). Negative for chills.   HENT: Positive for congestion, rhinorrhea and sore throat.    Eyes: Positive for pain, discharge, redness, itching and visual disturbance. Negative for photophobia.   Respiratory: Positive for cough and wheezing (at night ). Negative for shortness of breath.   "  Cardiovascular: Negative for chest pain.   Gastrointestinal: Negative for nausea and vomiting.   Musculoskeletal: Positive for arthralgias (right elbow) and myalgias.   Neurological: Negative for headaches.     Allergies   Allergen Reactions   • Penicillins Rash     Current Outpatient Medications on File Prior to Visit   Medication Sig Dispense Refill   • guaiFENesin (MUCINEX PO) Take  by mouth As Needed.     • nicotine (NICODERM CQ) 21 MG/24HR patch Place 1 patch on the skin as directed by provider Daily. 30 each 1     No current facility-administered medications on file prior to visit.     /89 (BP Location: Right arm, Patient Position: Sitting, Cuff Size: Adult)   Pulse 62   Temp 98.2 °F (36.8 °C) (Oral)   Ht 175.3 cm (69.02\")   Wt 75.8 kg (167 lb)   SpO2 94%   BMI 24.65 kg/m²     Objective     Physical Exam  Vitals and nursing note reviewed.   Constitutional:       General: He is not in acute distress.     Appearance: Normal appearance. He is not ill-appearing.   HENT:      Head: Normocephalic.      Right Ear: Tympanic membrane, ear canal and external ear normal.      Left Ear: Tympanic membrane, ear canal and external ear normal.      Nose: Congestion present.      Right Sinus: No maxillary sinus tenderness or frontal sinus tenderness.      Left Sinus: No maxillary sinus tenderness or frontal sinus tenderness.      Mouth/Throat:      Lips: Pink.      Mouth: Mucous membranes are moist.      Pharynx: Uvula midline. No pharyngeal swelling, oropharyngeal exudate, posterior oropharyngeal erythema or uvula swelling.   Eyes:      General:         Right eye: No discharge.         Left eye: Discharge present.     Extraocular Movements: Extraocular movements intact.      Conjunctiva/sclera:      Right eye: Right conjunctiva is not injected.      Left eye: Left conjunctiva is injected.      Pupils: Pupils are equal, round, and reactive to light.      Comments: Left upper eyelid swelling    Cardiovascular:     "  Rate and Rhythm: Normal rate and regular rhythm.      Pulses:           Radial pulses are 2+ on the right side.      Heart sounds: Normal heart sounds. No murmur heard.  Pulmonary:      Effort: Pulmonary effort is normal. No accessory muscle usage or respiratory distress.      Breath sounds: Rhonchi (clears with cough) present.   Musculoskeletal:      Right elbow: No swelling or deformity. Normal range of motion. Tenderness present in lateral epicondyle.      Comments: Patient reports pain with elbow extension   Lymphadenopathy:      Cervical: No cervical adenopathy.   Skin:     General: Skin is warm and dry.   Neurological:      General: No focal deficit present.      Mental Status: He is alert and oriented to person, place, and time.   Psychiatric:         Mood and Affect: Mood and affect normal.         Behavior: Behavior normal.            Results for orders placed or performed in visit on 05/10/23   Beta Strep Culture, Throat - Swab, Throat    Specimen: Throat; Swab   Result Value Ref Range    Throat Culture, Beta Strep No Beta Hemolytic Streptococcus Isolated    POCT SARS-CoV-2 Antigen HAYDEN + Flu    Specimen: Swab   Result Value Ref Range    SARS Antigen Not Detected Not Detected, Presumptive Negative    Influenza A Antigen HAYDEN Not Detected Not Detected    Influenza B Antigen HAYDEN Not Detected Not Detected    Internal Control Passed Passed    Lot Number [id number]     Expiration Date 12/6/23    POCT rapid strep A    Specimen: Swab   Result Value Ref Range    Rapid Strep A Screen Negative Negative, VALID, INVALID, Not Performed    Internal Control Passed Passed    Lot Number [id number]     Expiration Date 6/30/24      XR Chest PA & Lateral    Result Date: 5/10/2023  PROCEDURE: XR CHEST PA AND LATERAL  COMPARISON: None  INDICATIONS: COUGH AND CONGESTION FOR 2 WEEKS  FINDINGS:  Lungs are hyperexpanded with emphysematous changes noted with an upper lung zone predominance.  The heart and mediastinal contours are within  normal limits.  No focal consolidation is noted.  Blunting of the costophrenic angles likely related to hyperexpansion of the lungs.  Osseous structures are unremarkable        1. Hyperexpansion lungs without evidence of focal consolidation     BETO RILEY MD       Electronically Signed and Approved By: BETO RILEY MD on 5/10/2023 at 11:13                Diagnoses and all orders for this visit:    1. Acute URI (Primary)  -     doxycycline (VIBRAMYCIN) 100 MG capsule; Take 1 capsule by mouth 2 (Two) Times a Day for 7 days.  Dispense: 14 capsule; Refill: 0    2. Acute cough  Comments:  Negative rapid COVID and flu.  Orders:  -     POCT SARS-CoV-2 Antigen HAYDEN + Flu  -     XR Chest PA & Lateral; Future  -     benzonatate (Tessalon Perles) 100 MG capsule; Take 1 capsule by mouth 3 (Three) Times a Day As Needed for Cough.  Dispense: 21 capsule; Refill: 0    3. Sore throat  Comments:  Negative rapid strep, culture sent and pending. will treat accordingly.   Orders:  -     POCT SARS-CoV-2 Antigen HAYDEN + Flu  -     POCT rapid strep A  -     Beta Strep Culture, Throat - , Throat; Future  -     Beta Strep Culture, Throat - Swab, Throat    4. Left eye pain  -     Cancel: Ambulatory Referral for Diabetic Eye Exam-Optometry  -     Ambulatory Referral to Optometry    5. Redness of left eye  -     Cancel: Ambulatory Referral for Diabetic Eye Exam-Optometry  -     Ambulatory Referral to Optometry    6. Swelling of left eyelid  -     Cancel: Ambulatory Referral for Diabetic Eye Exam-Optometry  -     Ambulatory Referral to Optometry    7. Right elbow pain  Comments:  Referral sent to ortho, exam consistent with lateral epicondylitis. Continue with brace, ice, rest, diclofenac. Educated ok to use Tylenol but avoid other NSAID  Orders:  -     Ambulatory Referral to Orthopedic Surgery  -     diclofenac (VOLTAREN) 50 MG EC tablet; Take 1 tablet by mouth 2 (Two) Times a Day As Needed (pain).  Dispense: 14 tablet; Refill:  0    Other orders  -     albuterol sulfate  (90 Base) MCG/ACT inhaler; Inhale 2 puffs Every 4 (Four) Hours As Needed for Wheezing or Shortness of Air.  Dispense: 18 g; Refill: 0      Patient is negative for COVID, flu and strep. Given the duration of illness and rhonchi on exam, chest x ray ordered to rule out pneumonia. Plan to place patient on antibiotics irregardless, but antibiotic choice will be dictated by xray results. ER precautions advised.     Regarding his eye, STAT referral placed to Dr. Kemp for further evaluation as there is concern for a foreign body. Referrals team was able to schedule patient an eye appointment today, further plan per Dr. Kemp regarding his eye pain, swelling and redness.     Addendum: chest x ray shows hyperexpansion of lungs, but no acute process. This was discussed with his PCP. Follow up with PCP as needed. Doxycycline sent to pharmacy as well as an albuterol inhaler for any shortness of breath or wheezing.     Follow up:  Return if symptoms worsen or fail to improve.  Patient was given instructions and counseling regarding his condition or for health maintenance advice. Please see specific information pulled into the AVS if appropriate.

## 2023-05-12 LAB — BACTERIA SPEC AEROBE CULT: NORMAL

## 2023-07-26 ENCOUNTER — HOSPITAL ENCOUNTER (OUTPATIENT)
Dept: GENERAL RADIOLOGY | Facility: HOSPITAL | Age: 53
Discharge: HOME | End: 2023-07-26
Admitting: PHYSICIAN ASSISTANT
Payer: COMMERCIAL

## 2023-07-26 ENCOUNTER — OFFICE VISIT (OUTPATIENT)
Dept: ORTHOPEDIC SURGERY | Facility: CLINIC | Age: 53
End: 2023-07-26
Payer: COMMERCIAL

## 2023-07-26 VITALS — TEMPERATURE: 98.6 F | HEIGHT: 69 IN | BODY MASS INDEX: 25.18 KG/M2 | WEIGHT: 170 LBS

## 2023-07-26 DIAGNOSIS — M25.521 RIGHT ELBOW PAIN: ICD-10-CM

## 2023-07-26 DIAGNOSIS — M77.11 LATERAL EPICONDYLITIS OF RIGHT ELBOW: Primary | ICD-10-CM

## 2023-07-26 PROCEDURE — 73080 X-RAY EXAM OF ELBOW: CPT

## 2023-07-26 RX ORDER — METHYLPREDNISOLONE ACETATE 40 MG/ML
40 INJECTION, SUSPENSION INTRA-ARTICULAR; INTRALESIONAL; INTRAMUSCULAR; SOFT TISSUE
Status: COMPLETED | OUTPATIENT
Start: 2023-07-26 | End: 2023-07-26

## 2023-07-26 RX ORDER — LIDOCAINE HYDROCHLORIDE 10 MG/ML
0.5 INJECTION, SOLUTION EPIDURAL; INFILTRATION; INTRACAUDAL; PERINEURAL
Status: COMPLETED | OUTPATIENT
Start: 2023-07-26 | End: 2023-07-26

## 2023-07-26 RX ADMIN — LIDOCAINE HYDROCHLORIDE 0.5 ML: 10 INJECTION, SOLUTION EPIDURAL; INFILTRATION; INTRACAUDAL; PERINEURAL at 09:24

## 2023-07-26 RX ADMIN — METHYLPREDNISOLONE ACETATE 40 MG: 40 INJECTION, SUSPENSION INTRA-ARTICULAR; INTRALESIONAL; INTRAMUSCULAR; SOFT TISSUE at 09:24

## 2023-07-26 NOTE — PROGRESS NOTES
"Chief Complaint  Pain and Establish Care of the Right Elbow    Subjective    History of Present Illness      Maldonado Hammer is a 53 y.o. male who presents to [facility] for right elbow pain. The pain is located over the lateral aspect of the right elbow. He reports the pain onset was in the middle of 2022 without specific injury. Initially the pain began as a severe pain, but now is only mild. He describes the pain as a stabbing sensation. He notes he purchased an elbow brace which has been helping with his pain. He is an  and uses his hands constantly. He is taking Aleve as needed. He states he has pain when gripping somthing hard or lifting items. He denies numbness and tingling sensation.      Objective   Vital Signs:   Temp 98.6 °F (37 °C)   Ht 175.3 cm (69\")   Wt 77.1 kg (170 lb)   BMI 25.10 kg/m²       Physical Exam  Vitals and nursing note reviewed.   Constitutional:       Appearance: Normal appearance.   Pulmonary:      Effort: Pulmonary effort is normal.   Skin:     General: Skin is warm and dry.      Capillary Refill: Capillary refill takes less than 2 seconds.   Neurological:      General: No focal deficit present.      Mental Status: She is alert and oriented to person, place, and time. Mental status is at baseline.   Psychiatric:         Mood and Affect: Mood normal.         Behavior: Behavior normal.         Thought Content: Thought content normal.         Judgment: Judgment normal.     Ortho Exam   RIGHT elbow  Relationship of 3 bony points is well preserved.   There is no evidence of posterior interosseous nerve palsy.   Mild effusion of the elbow.   There is no ulnar neuritis.   Positive for tenderness over the lateral aspect of the elbow. Positive for point tenderness over the extensor supinator muscle mass.  Range of motion of the elbow is within normal limits.  Extension of the wrist against resistance is painful.  Radial artery pulses are " palpable.       Result Review :   Radiologic studies - see below for interpretation  RIGHT elbow xrays   3 views were performed at [facility] on 07/26/2023. Images were independently viewed and interpreted by myself, my impression as follows:  Findings: No acute bony abnormality. Small spur at the olecranon.  Bony lesion: no  Soft tissues: normal  Joint spaces: normal  Hardware appropriately positioned: not applicable  Prior studies available for comparison: no        PROCEDURE  Medium Joint Arthrocentesis: R elbow  Date/Time: 7/26/2023 9:24 AM  Consent given by: patient  Site marked: site marked  Timeout: Immediately prior to procedure a time out was called to verify the correct patient, procedure, equipment, support staff and site/side marked as required   Supporting Documentation  Indications: pain   Procedure Details  Location: elbow - R elbow  Preparation: Patient was prepped and draped in the usual sterile fashion  Approach: posterolateral  Medications administered: 0.5 mL lidocaine PF 1% 1 %; 40 mg methylPREDNISolone acetate 40 MG/ML  Patient tolerance: patient tolerated the procedure well with no immediate complications               Assessment   Assessment and Plan    Problem List Items Addressed This Visit          Musculoskeletal and Injuries    Lateral epicondylitis of right elbow - Primary    Relevant Orders    Medium Joint Arthrocentesis: R elbow           PLAN   Discussion of any imaging in detail. Discussion of orthopaedic goals.  Risk, benefits, and merits of treatment alternatives reviewed with the patient. Treatment alternatives include: oral anti-inflammatories/NSAID, intra-articular steroid injection, and bracing  Ice, heat, and/or modalities as beneficial  Risks of minor surgical procedure/intra-articular injection, including but not limited to pain, bleeding, infection into the joint, pigment skin changes related to steroid administration, increased blood sugar levels  secondary to steroid administration, scar, recurrence of pain, and tendon rupture associated with steroid administration and possible need for further surgery reviewed with patient.  He accepts these risks and wishes to proceed with procedure. Injected patient's right elbow joint(s) with Depo-Medrol from a(n)  posterolateral  approach at the lateral epicondyle.  Patient tolerated the procedure well and no complications were encountered.   Watch for signs and symptoms of infection  Call or notify for any adverse effect from injection therapy  Patient is encouraged to call or return for any issues or concerns.  Follow up as needed  Patient was given instructions and counseling regarding his condition or for health maintenance advice. Please see specific information pulled into the AVS if appropriate.     Jerrod Chi PA-C   Date of Encounter: 7/26/2023     Transcribed from ambient dictation for Jerrod Chi PA-C by Monty Maynard.  07/26/23   10:42 EDT    Patient or patient representative verbalized consent to the visit recording.  I have personally performed the services described in this document as transcribed by the above individual, and it is both accurate and complete.  Jerrod Chi PA-C  7/26/2023  12:38 EDT

## 2025-02-24 ENCOUNTER — OFFICE VISIT (OUTPATIENT)
Dept: FAMILY MEDICINE CLINIC | Age: 55
End: 2025-02-24
Payer: COMMERCIAL

## 2025-02-24 VITALS
DIASTOLIC BLOOD PRESSURE: 90 MMHG | HEIGHT: 69 IN | SYSTOLIC BLOOD PRESSURE: 144 MMHG | OXYGEN SATURATION: 97 % | WEIGHT: 162.3 LBS | BODY MASS INDEX: 24.04 KG/M2 | HEART RATE: 80 BPM | TEMPERATURE: 98.1 F

## 2025-02-24 DIAGNOSIS — R09.81 NASAL CONGESTION: ICD-10-CM

## 2025-02-24 DIAGNOSIS — R05.1 ACUTE COUGH: ICD-10-CM

## 2025-02-24 DIAGNOSIS — J01.90 ACUTE SINUSITIS, RECURRENCE NOT SPECIFIED, UNSPECIFIED LOCATION: Primary | ICD-10-CM

## 2025-02-24 DIAGNOSIS — H61.23 BILATERAL IMPACTED CERUMEN: ICD-10-CM

## 2025-02-24 PROCEDURE — 69209 REMOVE IMPACTED EAR WAX UNI: CPT | Performed by: STUDENT IN AN ORGANIZED HEALTH CARE EDUCATION/TRAINING PROGRAM

## 2025-02-24 PROCEDURE — 99213 OFFICE O/P EST LOW 20 MIN: CPT | Performed by: STUDENT IN AN ORGANIZED HEALTH CARE EDUCATION/TRAINING PROGRAM

## 2025-02-24 RX ORDER — CEFDINIR 300 MG/1
300 CAPSULE ORAL 2 TIMES DAILY
Qty: 14 CAPSULE | Refills: 0 | Status: SHIPPED | OUTPATIENT
Start: 2025-02-24

## 2025-02-24 RX ORDER — FLUTICASONE PROPIONATE 50 MCG
1 SPRAY, SUSPENSION (ML) NASAL 2 TIMES DAILY
Qty: 16 G | Refills: 0 | Status: SHIPPED | OUTPATIENT
Start: 2025-02-24

## 2025-02-25 NOTE — PROGRESS NOTES
Chief Complaint     Cough (Pt c/o cough, pain with deep breaths, feels bruised in the middle of his chest. Pt is feeling better but is afraid he has sx of Pleurisy./Onset since Friday.//Pt declines C/F testing.)    History of Present Illness     Maldonado Hammer is a 55 y.o. male who presents to [facility] with complaints of cough.       History of Present Illness  The patient is a 55-year-old male who presents to the office with complaints of a cough and congestion.    He reports experiencing intermittent headaches, which have shown some improvement. He has been dealing with a persistent cough since Christmas, which has recently improved. The cough was productive last week, yielding brownish-yellow sputum, but has since become less severe. He has been self-medicating with Robitussin or NyQuil at night, which aids in sleep. He also reports nasal congestion and frequent nose blowing but does not experience sinus pressure, fever, chills, body aches, postnasal drainage, sore throat, nausea, vomiting, or diarrhea. He recalls having chills and body aches several weeks ago. He is allergic to PENICILLIN.    He began experiencing chest pain on Friday, described as similar to a bruise, which is exacerbated by deep breathing or coughing. A nurse suggested the possibility of pleurisy, prompting his visit to the clinic.    ALLERGIES  The patient is allergic to PENICILLINS.    MEDICATIONS  Robitussin, NyQuil         History      No past medical history on file.    Past Surgical History:   Procedure Laterality Date    VASECTOMY         Family History   Problem Relation Age of Onset    Cancer Mother         colon    Hypertension Mother         Current Medications        Current Outpatient Medications:     Pseudoeph-Doxylamine-DM-APAP (NYQUIL PO), Take  by mouth Every Night., Disp: , Rfl:     albuterol sulfate  (90 Base) MCG/ACT inhaler, Inhale 2 puffs Every 4 (Four) Hours As Needed for  "Wheezing or Shortness of Air. (Patient not taking: Reported on 2/24/2025), Disp: 18 g, Rfl: 0    benzonatate (Tessalon Perles) 100 MG capsule, Take 1 capsule by mouth 3 (Three) Times a Day As Needed for Cough. (Patient not taking: Reported on 2/24/2025), Disp: 21 capsule, Rfl: 0    cefdinir (OMNICEF) 300 MG capsule, Take 1 capsule by mouth 2 (Two) Times a Day., Disp: 14 capsule, Rfl: 0    diclofenac (VOLTAREN) 50 MG EC tablet, Take 1 tablet by mouth 2 (Two) Times a Day As Needed (pain). (Patient not taking: Reported on 2/24/2025), Disp: 14 tablet, Rfl: 0    fluticasone (FLONASE) 50 MCG/ACT nasal spray, Administer 1 spray into the nostril(s) as directed by provider 2 (Two) Times a Day., Disp: 16 g, Rfl: 0    guaiFENesin (MUCINEX PO), Take  by mouth As Needed. (Patient not taking: Reported on 2/24/2025), Disp: , Rfl:     nicotine (NICODERM CQ) 21 MG/24HR patch, Place 1 patch on the skin as directed by provider Daily. (Patient not taking: Reported on 2/24/2025), Disp: 30 each, Rfl: 1     Allergies     Allergies   Allergen Reactions    Penicillins Rash       Social History       Social History     Social History Narrative    Not on file       Immunizations     Immunization:  There is no immunization history for the selected administration types on file for this patient.       Objective     Objective     Vital Signs:   /90 (BP Location: Left arm, Patient Position: Sitting)   Pulse 80   Temp 98.1 °F (36.7 °C) (Oral)   Ht 175.3 cm (69\")   Wt 73.6 kg (162 lb 4.8 oz)   SpO2 97% Comment: room air  BMI 23.97 kg/m²       Physical Exam  Vitals and nursing note reviewed.   Constitutional:       Appearance: Normal appearance. He is normal weight.   HENT:      Head: Normocephalic.      Right Ear: Tympanic membrane, ear canal and external ear normal.      Left Ear: Tympanic membrane, ear canal and external ear normal.      Ears:      Comments: Bilateral impacted cerumen, after cerumen removal tympanic membranes within " normal limits     Nose: Congestion present.      Mouth/Throat:      Lips: Pink.      Mouth: Mucous membranes are moist.      Pharynx: Oropharynx is clear. Uvula midline.   Eyes:      Conjunctiva/sclera: Conjunctivae normal.      Pupils: Pupils are equal, round, and reactive to light.   Cardiovascular:      Rate and Rhythm: Normal rate and regular rhythm.      Pulses: Normal pulses.      Heart sounds: Normal heart sounds.   Pulmonary:      Effort: Pulmonary effort is normal.      Breath sounds: Wheezing present.      Comments: Cough present, faint wheeze is present throughout  Abdominal:      General: Bowel sounds are normal.      Palpations: Abdomen is soft.   Musculoskeletal:         General: Normal range of motion.      Cervical back: Normal range of motion and neck supple.   Lymphadenopathy:      Cervical:      Right cervical: Superficial cervical adenopathy present.      Left cervical: No superficial cervical adenopathy.   Skin:     General: Skin is warm and dry.   Neurological:      General: No focal deficit present.      Mental Status: He is alert and oriented to person, place, and time.   Psychiatric:         Attention and Perception: Attention normal.         Mood and Affect: Mood and affect normal.         Behavior: Behavior normal. Behavior is cooperative.         Physical Exam  Throat was examined.  There is a slight wheeze in the lungs.      Results    The following data was reviewed by: JIHAN Starr on 02/25/25               Results        Ear Cerumen Removal    Date/Time: 2/24/2025 4:40 PM    Performed by: Anastasiia Elkins MA  Authorized by: Skye Moe APRN    Anesthesia:  Local Anesthetic: none  Location details: left ear and right ear  Patient tolerance: patient tolerated the procedure well with no immediate complications  Procedure type: irrigation   Sedation:  Patient sedated: no          Assessment and Plan        Assessment and Plan       Acute sinusitis, recurrence not specified,  unspecified location    Orders:    cefdinir (OMNICEF) 300 MG capsule; Take 1 capsule by mouth 2 (Two) Times a Day.    fluticasone (FLONASE) 50 MCG/ACT nasal spray; Administer 1 spray into the nostril(s) as directed by provider 2 (Two) Times a Day.    Acute cough  May continue to use Robitussin  Discussed use of cough drops, Chloraseptic spray/lozenges, Vicks VapoRub, hot tea with honey, humidifier.       Nasal congestion    Orders:    fluticasone (FLONASE) 50 MCG/ACT nasal spray; Administer 1 spray into the nostril(s) as directed by provider 2 (Two) Times a Day.    Bilateral impacted cerumen    Orders:    Ear Cerumen Removal         Assessment & Plan  1. Cough and congestion.  He reports a persistent cough with brownish-yellow sputum and nasal congestion. The cough has improved over the past few days but was severe last week. He will continue using Robitussin as it has been effective. Cefdinir will be prescribed to address sinus infection. Flonase will be added to help dry up nasal congestion. Prescriptions will be sent to [facility] pharmacy.    2. Chest pain.  He reports chest pain that occurs with deep breaths and coughing, which started on Friday. The pain is described as similar to a bruise and is located in the middle of the chest.  Likely due to coughing.    3. Cerumen impaction.  His ears will be flushed to remove wax buildup, as it is obstructing the view and potentially contributing to symptoms.        Follow Up        Follow Up   Return for With PCP, Annual physical, sooner if condition worsens.  Patient was given instructions and counseling regarding his condition or for health maintenance advice. Please see specific information pulled into the AVS if appropriate.      Patient or patient representative verbalized consent for the use of Ambient Listening during the visit with  JIHAN Starr for chart documentation. 2/25/2025  11:51 EST